# Patient Record
Sex: MALE | Race: BLACK OR AFRICAN AMERICAN | NOT HISPANIC OR LATINO | URBAN - METROPOLITAN AREA
[De-identification: names, ages, dates, MRNs, and addresses within clinical notes are randomized per-mention and may not be internally consistent; named-entity substitution may affect disease eponyms.]

---

## 2017-04-24 ENCOUNTER — HOSPITAL ENCOUNTER (EMERGENCY)
Facility: CLINIC | Age: 37
Discharge: HOME | End: 2017-04-25
Attending: EMERGENCY MEDICINE | Admitting: EMERGENCY MEDICINE
Payer: MEDICAID

## 2017-04-24 DIAGNOSIS — M79.605 PAIN OF LEFT LOWER EXTREMITY: ICD-10-CM

## 2017-04-24 DIAGNOSIS — L29.9 PRURITUS OF SKIN: ICD-10-CM

## 2017-04-24 DIAGNOSIS — L74.519 PRIMARY FOCAL HYPERHIDROSIS: ICD-10-CM

## 2017-04-24 PROCEDURE — 25000132 ZZH RX MED GY IP 250 OP 250 PS 637: Performed by: EMERGENCY MEDICINE

## 2017-04-24 PROCEDURE — 99283 EMERGENCY DEPT VISIT LOW MDM: CPT | Performed by: EMERGENCY MEDICINE

## 2017-04-24 PROCEDURE — 99283 EMERGENCY DEPT VISIT LOW MDM: CPT | Mod: Z6 | Performed by: EMERGENCY MEDICINE

## 2017-04-24 RX ORDER — DIPHENHYDRAMINE HCL 50 MG
50 CAPSULE ORAL ONCE
Status: COMPLETED | OUTPATIENT
Start: 2017-04-24 | End: 2017-04-24

## 2017-04-24 RX ADMIN — DIPHENHYDRAMINE HYDROCHLORIDE 50 MG: 50 CAPSULE ORAL at 23:52

## 2017-04-24 ASSESSMENT — ENCOUNTER SYMPTOMS
CONSTIPATION: 0
COUGH: 0
HEADACHES: 0
VOMITING: 0
NECK PAIN: 0
FEVER: 0
WEAKNESS: 0
FREQUENCY: 0
POLYDIPSIA: 0
COLOR CHANGE: 0
NAUSEA: 0
HEMATURIA: 0
BLOOD IN STOOL: 0
SORE THROAT: 0
ABDOMINAL PAIN: 0
PALPITATIONS: 0
DYSURIA: 0
CHILLS: 0
TROUBLE SWALLOWING: 0
EYE PAIN: 0
SHORTNESS OF BREATH: 0
BACK PAIN: 0
DIARRHEA: 0

## 2017-04-24 NOTE — ED AVS SNAPSHOT
[facility], [place], Emergency Department    [address] [place] [address]    Phone:  [phone]                                       Saba Keith   MRN: [id number]    Department:  [facility], Emergency Department   Date of Visit:  4/24/2017           After Visit Summary Signature Page     I have received my discharge instructions, and my questions have been answered. I have discussed any challenges I see with this plan with the nurse or doctor.    ..........................................................................................................................................  Patient/Patient Representative Signature      ..........................................................................................................................................  Patient Representative Print Name and Relationship to Patient    ..................................................               ................................................  Date                                            Time    ..........................................................................................................................................  Reviewed by Signature/Title    ...................................................              ..............................................  Date                                                            Time

## 2017-04-24 NOTE — ED AVS SNAPSHOT
[facility], Emergency Department    [address] [facility] [place] [address]    Phone:  [phone]                                       Saba Keith   MRN: [id number]    Department:  [facility], Emergency Department   Date of Visit:  4/24/2017           Patient Information     Date Of Birth          1980        Your diagnoses for this visit were:     Pain of left lower extremity        You were seen by Nickie Zarate MD.        Discharge Instructions       TODAY'S VISIT:  You were seen today for left leg itching and abnormal feeling.  - On examination today he do not have clear physical findings for cellulitis/skin infection.  - Your laboratory evaluations including your weight blood cells/infection fighting cells, and inflammatory markers were all in the normal/negative range.    FOLLOW-UP:  Please make an appointment to follow up with:  - Your Primary Care Provider   - If needed, the Neurology Clinic (phone: [phone])     PRESCRIPTIONS / MEDICATIONS:  - You can use the as needed Benadryl/diphenhydramine for itching relief  - You can also try the oral steroids for the next 5 days to see if this helps with some of your symptoms.    OTHER INSTRUCTIONS:  - Do your best to not itch this area.  Try to wear clothing that does not seem to irritate your leg.    RETURN TO THE EMERGENCY DEPARTMENT  Return to the Emergency Department at any time for new/worsening symptoms.       24 Hour Appointment Hotline       To make an appointment at any [facility], call [phone] ([phone]). If you don't have a family doctor or clinic, we will help you find one. [place] clinics are conveniently located to serve the needs of you and your family.             Review of your medicines      START taking        Dose / Directions Last dose taken    diphenhydrAMINE 25 MG tablet   Commonly known as:  BENADRYL   Dose:  25-50 mg   Quantity:  60 tablet        Take 1-2 tablets (25-50 mg) by mouth every 6 hours as  "needed for itching   Refills:  0        predniSONE 20 MG tablet   Commonly known as:  DELTASONE   Quantity:  10 tablet        Take two tablets (= 40mg) each day for 5 (five) days   Refills:  0          Our records show that you are taking the medicines listed below. If these are incorrect, please call your family doctor or clinic.        Dose / Directions Last dose taken    oxyCODONE-acetaminophen 5-325 MG per tablet   Commonly known as:  PERCOCET   Dose:  1 tablet        Take 1 tablet by mouth every 4 hours as needed for moderate to severe pain   Refills:  0                Prescriptions were sent or printed at these locations (2 Prescriptions)                   Other Prescriptions                Printed at Department/Unit printer (2 of 2)         diphenhydrAMINE (BENADRYL) 25 MG tablet               predniSONE (DELTASONE) 20 MG tablet                Procedures and tests performed during your visit     Basic metabolic panel    CBC with platelets differential    CRP inflammation    Erythrocyte sedimentation rate auto      Orders Needing Specimen Collection     None      Pending Results     No orders found for last 3 day(s).            Pending Culture Results     No orders found for last 3 day(s).            Thank you for choosing [facility]       Thank you for choosing [facility] for your care. Our goal is always to provide you with excellent care. Hearing back from our patients is one way we can continue to improve our services. Please take a few minutes to complete the written survey that you may receive in the mail after you visit with us. Thank you!        [employer] Information     [employer] lets you send messages to your doctor, view your test results, renew your prescriptions, schedule appointments and more. To sign up, go to [email or web address] . Click on \"Log in\" on the left side of the screen, which will take you to the Welcome page. Then click on \"Sign up Now\" on the right side of the page.     You will be " asked to enter the access code listed below, as well as some personal information. Please follow the directions to create your username and password.     Your access code is: [email or web address]  Expires: 2017  1:09 AM     Your access code will  in 90 days. If you need help or a new code, please call your [facility] clinic or [phone].        Care EveryWhere ID     This is your Care EveryWhere ID. This could be used by other organizations to access your [facility] medical records  [id number]        After Visit Summary       This is your record. Keep this with you and show to your community pharmacist(s) and doctor(s) at your next visit.

## 2017-04-25 VITALS
DIASTOLIC BLOOD PRESSURE: 76 MMHG | HEIGHT: 66 IN | TEMPERATURE: 98.7 F | WEIGHT: 200 LBS | BODY MASS INDEX: 32.14 KG/M2 | SYSTOLIC BLOOD PRESSURE: 142 MMHG | RESPIRATION RATE: 16 BRPM | HEART RATE: 70 BPM | OXYGEN SATURATION: 99 %

## 2017-04-25 LAB
ANION GAP SERPL CALCULATED.3IONS-SCNC: 6 MMOL/L (ref 3–14)
BASOPHILS # BLD AUTO: 0 10E9/L (ref 0–0.2)
BASOPHILS NFR BLD AUTO: 0.3 %
BUN SERPL-MCNC: 9 MG/DL (ref 7–30)
CALCIUM SERPL-MCNC: 8.2 MG/DL (ref 8.5–10.1)
CHLORIDE SERPL-SCNC: 108 MMOL/L (ref 94–109)
CO2 SERPL-SCNC: 24 MMOL/L (ref 20–32)
CREAT SERPL-MCNC: 0.95 MG/DL (ref 0.66–1.25)
CRP SERPL-MCNC: 3.5 MG/L (ref 0–8)
DIFFERENTIAL METHOD BLD: ABNORMAL
EOSINOPHIL # BLD AUTO: 0.3 10E9/L (ref 0–0.7)
EOSINOPHIL NFR BLD AUTO: 4.4 %
ERYTHROCYTE [DISTWIDTH] IN BLOOD BY AUTOMATED COUNT: 12.7 % (ref 10–15)
ERYTHROCYTE [SEDIMENTATION RATE] IN BLOOD BY WESTERGREN METHOD: 9 MM/H (ref 0–15)
GFR SERPL CREATININE-BSD FRML MDRD: 89 ML/MIN/1.7M2
GLUCOSE SERPL-MCNC: 122 MG/DL (ref 70–99)
HCT VFR BLD AUTO: 39.3 % (ref 40–53)
HGB BLD-MCNC: 13.6 G/DL (ref 13.3–17.7)
IMM GRANULOCYTES # BLD: 0 10E9/L (ref 0–0.4)
IMM GRANULOCYTES NFR BLD: 0.2 %
LYMPHOCYTES # BLD AUTO: 3.5 10E9/L (ref 0.8–5.3)
LYMPHOCYTES NFR BLD AUTO: 58.3 %
MCH RBC QN AUTO: 32.7 PG (ref 26.5–33)
MCHC RBC AUTO-ENTMCNC: 34.6 G/DL (ref 31.5–36.5)
MCV RBC AUTO: 95 FL (ref 78–100)
MONOCYTES # BLD AUTO: 0.5 10E9/L (ref 0–1.3)
MONOCYTES NFR BLD AUTO: 8.1 %
NEUTROPHILS # BLD AUTO: 1.7 10E9/L (ref 1.6–8.3)
NEUTROPHILS NFR BLD AUTO: 28.7 %
NRBC # BLD AUTO: 0 10*3/UL
NRBC BLD AUTO-RTO: 0 /100
PLATELET # BLD AUTO: 191 10E9/L (ref 150–450)
POTASSIUM SERPL-SCNC: 3.8 MMOL/L (ref 3.4–5.3)
RBC # BLD AUTO: 4.16 10E12/L (ref 4.4–5.9)
SODIUM SERPL-SCNC: 138 MMOL/L (ref 133–144)
WBC # BLD AUTO: 5.9 10E9/L (ref 4–11)

## 2017-04-25 PROCEDURE — 85652 RBC SED RATE AUTOMATED: CPT | Performed by: EMERGENCY MEDICINE

## 2017-04-25 PROCEDURE — 86140 C-REACTIVE PROTEIN: CPT | Performed by: EMERGENCY MEDICINE

## 2017-04-25 PROCEDURE — 80048 BASIC METABOLIC PNL TOTAL CA: CPT | Performed by: EMERGENCY MEDICINE

## 2017-04-25 PROCEDURE — 85025 COMPLETE CBC W/AUTO DIFF WBC: CPT | Performed by: EMERGENCY MEDICINE

## 2017-04-25 RX ORDER — PREDNISONE 20 MG/1
TABLET ORAL
Qty: 10 TABLET | Refills: 0 | Status: SHIPPED | OUTPATIENT
Start: 2017-04-25 | End: 2021-03-26

## 2017-04-25 RX ORDER — DIPHENHYDRAMINE HCL 25 MG
25-50 TABLET ORAL EVERY 6 HOURS PRN
Qty: 60 TABLET | Refills: 0 | Status: SHIPPED | OUTPATIENT
Start: 2017-04-25 | End: 2021-03-26

## 2017-04-25 NOTE — ED NOTES
Pt presents to ED with complaints of left leg pain that stemmed from two weeks of constant itching and scratching. Pt states his leg is now painful, red and swollen. Pt has attempted to use cortisone cream for th itching but it has not helped. Pt states his leg is very tender to the touch

## 2017-04-25 NOTE — ED PROVIDER NOTES
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 He suspects this may be due to some recently purchased clothing he wore without washing. (Has since washed the clothing) Per chart review the patient was seen here for similar symptoms 3 years ago and again 6 years ago. In 2014 he was encouraged to try Benadryl OTC at bedtime to assist with itching and then also encouraged to apply Aquaphor lotion to the area for adequate moisturization.    The Patient has been using hydrocortisone cream with no relief. He states symptoms are gradually worsening with increased itching causing it to spread to his right leg as well.He is concerned for infection as his left leg has increased in tenderness and pain. Denies any fatigue, fever, chills, nausea, vomiting, no swelling SOB, cough, diarrhea, chest or abdominal pain. He denies a history of heart disease or shortness of breath.  No other new symptoms or complaints at this time. No other new potential exposures/irritants that he's aware of/no other new clothing, detergent, soap exposure, etc. See ROS for further details.     Reports he's otherwise healthy, no immunosuppresive conditions or medications, no signiicant illnesses or infections.  Works in [employer]      I have reviewed the Medications, Allergies, Past Medical and Surgical History, and Social History in the Epic system.  - PMH: Hx synovial sarcoma, intermittent left leg itching  - PSH: RLE surgery for synovial sarcoma  - SH: Works in [id number]    Review of Systems   Constitutional: Negative for chills and fever.   HENT: Negative for sore throat and trouble swallowing.    Eyes: Negative for pain and visual disturbance.   Respiratory: Negative for  "cough and shortness of breath.    Cardiovascular: Negative for chest pain, palpitations and leg swelling.   Gastrointestinal: Negative for abdominal pain, blood in stool, constipation, diarrhea, nausea and vomiting.   Endocrine: Negative for polydipsia and polyuria.   Genitourinary: Negative for dysuria, frequency and hematuria.   Musculoskeletal: Negative for back pain and neck pain.        Left lower leg itching, sweating   Skin: Negative for color change and rash.   Allergic/Immunologic: Negative for food allergies and immunocompromised state.   Neurological: Negative for weakness and headaches.       Physical Exam     Vitals:    04/24/17 2259   BP: 142/76   Pulse: 73   Resp: 14   Temp: 98.7  F (37.1  C)   TempSrc: Oral   SpO2: 100%   Weight: 90.7 kg (200 lb)   Height: 1.676 m (5' 6\")     Physical Exam   CONSTITUTIONAL: Well-developed and well-nourished. Awake and alert. Non-toxic appearance. No acute distress.   HENT:   - Head: Normocephalic and atraumatic.   - Ears: Hearing and external ear grossly normal.   - Nose: Nose normal. No rhinorrhea. No epistaxis.   - Mouth/Throat: Oropharynx is clear and MMM  EYES: Conjunctivae and lids are normal. No scleral icterus.   NECK: Normal range of motion and phonation normal. Neck supple.  No tracheal deviation, no stridor. No edema or erythema noted.  CARDIOVASCULAR: Normal rate, regular rhythm and no appreciable abnormal heart sounds.  PULMONARY/CHEST: Effort normal. No accessory muscle usage or stridor. No respiratory distress.  No appreciable abnormal breath sounds.  ABDOMEN: Soft, non-distended. No tenderness. No rigidity, rebound or guarding.   MUSCULOSKELETAL: Extremities warm and seemingly well perfused. No edema or calf tenderness. Left lower leg has what looks like fine sweating but not actual weeping, no swelling, no calf tenderness. Skin has no excoriations, no abnormal warmth, no erythema, induration, fullness or fluctuance. Joints normal size w/o effusion. " Strength and sensory intact.   NEUROLOGIC: Awake, alert. Not disoriented. He displays no atrophy and no tremor.  Normal tone. No seizure activity. Coordination normal. GCS 15  SKIN: Skin is warm and dry. No rash noted. No diaphoresis. No pallor.   PSYCHIATRIC: Normal mood and affect. Speech and behavior normal. Thought processes linear. Cognition and memory are normal.       ED Course     ED Course   Comment By Time   WBC, sed rate and inflammatory markers are all normal/negative Nickie Zarate MD 04/25 0123   Patient confirms that he does not have any history of tuberculosis or other infectious or immunologic conditions for which he should not have oral steroids Nickie Zarate MD 04/25 0125     Procedures       11:20 PM  The patient was seen and examined by Dr. Zarate in Room 20.            Labs Ordered and Resulted from Time of ED Arrival Up to the Time of Departure from the ED - No data to display         Assessments & Plan (with Medical Decision Making)   IMPRESSION: 37 yo M, presents with left leg itching and moisture, as described further above in the HPI/ROS. Despite being told on a visit in the past that it was likely infected, that was not the impression had by the provider in 2014, and doesn't look clearly infected at this point. Interestingly, on exam, seems to be having unilateral lower leg sweating. No erythema or warmth, no induration or any other classic findings for cellulitis, and does not appear swollen enough where would think would be weeping from edema. No hx for DVt and does not look swollen. No excoriation or findings for secondary infection. Neurovascularly intact throughout w/o bony tenderness, tenderness or obvious injury. Joints have no effusions or pain with ROM, normal compartments. Could be contact/allergic type reaction. Less likely RSD, or other neuro issue    PLAN: Basic labs, discuss w/ Neuro, symptom management; will likely need to F/U further as outpatient.      RESULTS:  See ED Course section above for particular pertinent findings and comments  - Labs: Labs, including inflammatory markers, WNL    INTERVENTIONS:   PO diphenhydramine    RE-EVALUATION:  Patient continues to look well.     DISCUSSIONS:  - I discussed the care of the patient with   - w/ Neuro: Discussed whether this could be an atypical RSD presentation which may be possible given unilateral extremity involvement, but would expect more pain.   - w/ Patient: I have reviewed the findings, diagnosis, plan and need for follow up with the patient.    DISPOSITION/PLANNING:  - FINAL IMPRESSION: LLE itching, sweating   - DISPOSITION: D/C to home  --- Follow-up: with PCP +/- Neurology  --- Recommendations: Conservative symptom management, strict return instructions  --- Rx:  PRN Benadryl, steroid burst      ______________________________________________________________________________    - I have reviewed the available nursing notes.            New Prescriptions    No medications on file       Final diagnoses:   None     Lynette MINOR , am serving as a trained medical scribe to document services personally performed by Nickie Zarate MD, based on the provider's statements to me.   Nickie MINOR MD, was physically present and have reviewed and verified the accuracy of this note documented by Lynette Hudson.    4/24/2017   [facility], [place], EMERGENCY DEPARTMENT     Nickie Zarate MD  04/27/17 4895

## 2017-04-25 NOTE — DISCHARGE INSTRUCTIONS
TODAY'S VISIT:  You were seen today for left leg itching and abnormal feeling.  - On examination today he do not have clear physical findings for cellulitis/skin infection.  - Your laboratory evaluations including your weight blood cells/infection fighting cells, and inflammatory markers were all in the normal/negative range.    FOLLOW-UP:  Please make an appointment to follow up with:  - Your Primary Care Provider   - If needed, the Neurology Clinic (phone: [phone])     PRESCRIPTIONS / MEDICATIONS:  - You can use the as needed Benadryl/diphenhydramine for itching relief  - You can also try the oral steroids for the next 5 days to see if this helps with some of your symptoms.    OTHER INSTRUCTIONS:  - Do your best to not itch this area.  Try to wear clothing that does not seem to irritate your leg.    RETURN TO THE EMERGENCY DEPARTMENT  Return to the Emergency Department at any time for new/worsening symptoms.

## 2018-06-14 ENCOUNTER — PRE VISIT (OUTPATIENT)
Dept: UROLOGY | Facility: CLINIC | Age: 38
End: 2018-06-14

## 2018-06-14 NOTE — TELEPHONE ENCOUNTER
MEDICAL RECORDS REQUEST   [facility]  Urology Clinic  [address] [place] [address]  PHONE: [phone]  Fax: [phone]        FUTURE VISIT INFORMATION                                                   Saba Keith, : 1980 scheduled for future visit at [facility]    APPOINTMENT INFORMATION:    Date: 2018    Provider:  PIEDAD DALY    Reason for Visit/Diagnosis: ERECTILE DYSFUNCTION    REFERRAL INFORMATION:    Referring provider:  SELF    Specialty: SELF    Referring providers clinic:  SELF    Clinic contact number:  [facility]    RECORDS REQUESTED FOR VISIT                                                     NOTES  STATUS/DETAILS   OFFICE NOTE from referring provider  no   OFFICE NOTE from other specialist  no   DISCHARGE SUMMARY from hospital  no   DISCHARGE REPORT from the ER  no   OPERATIVE REPORT  no   MEDICATION LIST  yes       PRE-VISIT CHECKLIST      Record collection complete Yes  NO OUTSIDE RECORDS ..CDK   Appointment appropriately scheduled           (right time/right provider) Yes   Joint diagnostic appointment coordinated correctly          (ensure right order & amount of time) Yes   MyChart activation Yes   Questionnaire complete If no, please explain IN PROCESS     Completed by: Chelle Monge

## 2018-07-09 ENCOUNTER — PRE VISIT (OUTPATIENT)
Dept: UROLOGY | Facility: CLINIC | Age: 38
End: 2018-07-09

## 2018-07-09 NOTE — TELEPHONE ENCOUNTER
Patient with history of ED coming in for consult with Dr. Hunt. Patient chart reviewed, no need for call, all records available and ready for appointment.

## 2018-09-06 ENCOUNTER — HOSPITAL ENCOUNTER (EMERGENCY)
Facility: CLINIC | Age: 38
Discharge: HOME | End: 2018-09-07
Attending: EMERGENCY MEDICINE | Admitting: EMERGENCY MEDICINE
Payer: MEDICAID

## 2018-09-06 DIAGNOSIS — H60.393 INFECTIVE OTITIS EXTERNA, BILATERAL: ICD-10-CM

## 2018-09-06 PROCEDURE — 99283 EMERGENCY DEPT VISIT LOW MDM: CPT | Mod: Z6 | Performed by: EMERGENCY MEDICINE

## 2018-09-06 PROCEDURE — 99283 EMERGENCY DEPT VISIT LOW MDM: CPT | Performed by: EMERGENCY MEDICINE

## 2018-09-06 RX ORDER — CIPROFLOXACIN AND DEXAMETHASONE 3; 1 MG/ML; MG/ML
4 SUSPENSION/ DROPS AURICULAR (OTIC) 2 TIMES DAILY
Status: DISCONTINUED | OUTPATIENT
Start: 2018-09-07 | End: 2018-09-07 | Stop reason: HOSPADM

## 2018-09-06 RX ORDER — CIPROFLOXACIN AND DEXAMETHASONE 3; 1 MG/ML; MG/ML
4 SUSPENSION/ DROPS AURICULAR (OTIC) 2 TIMES DAILY
Qty: 7.5 ML | Refills: 0 | Status: SHIPPED | OUTPATIENT
Start: 2018-09-06 | End: 2018-09-13

## 2018-09-06 NOTE — ED AVS SNAPSHOT
[facility], [place], Emergency Department    [address] [place] [address]    Phone:  [phone]                                       Saba Keith   MRN: [id number]    Department:  [facility], Emergency Department   Date of Visit:  9/6/2018           After Visit Summary Signature Page     I have received my discharge instructions, and my questions have been answered. I have discussed any challenges I see with this plan with the nurse or doctor.    ..........................................................................................................................................  Patient/Patient Representative Signature      ..........................................................................................................................................  Patient Representative Print Name and Relationship to Patient    ..................................................               ................................................  Date                                            Time    ..........................................................................................................................................  Reviewed by Signature/Title    ...................................................              ..............................................  Date                                                            Time          22EPIC Rev 08/18

## 2018-09-06 NOTE — ED AVS SNAPSHOT
[facility], Emergency Department    [address] [address]    Phone:  [phone]                                       Saba Keith   MRN: [id number]    Department:  [facility], Emergency Department   Date of Visit:  9/6/2018           Patient Information     Date Of Birth          1980        Your diagnoses for this visit were:     Infective otitis externa, bilateral        You were seen by Sam Dick DO.        Discharge Instructions         Return to the emergency department if symptoms continue, get worse, there are any new symptoms or any cause for concern.  External Ear Infection (Adult)    External otitis (also called  swimmer s ear ) is an infection in the ear canal. It is often caused by bacteria or fungus. It can occur a few days after water gets trapped in the ear canal (from swimming or bathing). It can also occur after cleaning too deeply in the ear canal with a cotton swab or other object. Sometimes, hair care products get into the ear canal and cause this problem.  Symptoms can include pain, fever, itching, redness, drainage, or swelling of the ear canal. Temporary hearing loss may also occur.  Home care    Do not try to clean the ear canal. This can push pus and bacteria deeper into the canal.    Use prescribed ear drops as directed. These help reduce swelling and fight the infection. If an ear wick was placed in the ear canal, apply drops right onto the end of the wick. The wick will draw the medicine into the ear canal even if it is swollen closed.    A cotton ball may be loosely placed in the outer ear to absorb any drainage.    You may use acetaminophen or ibuprofen to control pain, unless another medicine was prescribed. Note: If you have chronic liver or kidney disease or ever had a stomach ulcer or GI bleeding, talk to your healthcare provider before taking any of these medicines.    Do not allow water to get into your ear when bathing. Also, don't swim  until the infection has cleared.  Prevention    Keep your ears dry. This helps lower the risk of infection. Dry your ears with a towel or hair dryer after getting wet. Also, use ear plugs when swimming.    Do not stick any objects in the ear to remove wax.    If you feel water trapped in your ear, use ear drops right away. You can get these drops over the counter at most drugstores. They work by removing water from the ear canal.  Follow-up care  Follow up with your healthcare provider in 1 week, or as advised.  When to seek medical advice  Call your healthcare provider right away if any of these occur:    Ear pain becomes worse or doesn t improve after 3 days of treatment    Redness or swelling of the outer ear occurs or gets worse    Headache    Painful or stiff neck    Drowsiness or confusion    Fever of 100.4 F (38 C) or higher, or as directed by your healthcare provider    Seizure  Date Last Reviewed: 10/1/2017    [id number] The [employer]. [address] [place] [address]. All rights reserved. This information is not intended as a substitute for professional medical care. Always follow your healthcare professional's instructions.          24 Hour Appointment Hotline       To make an appointment at any [facility] clinic, call [phone] ([phone]). If you don't have a family doctor or clinic, we will help you find one. [facility] clinics are conveniently located to serve the needs of you and your family.             Review of your medicines      START taking        Dose / Directions Last dose taken    amoxicillin-clavulanate 875-125 MG per tablet   Commonly known as:  AUGMENTIN   Dose:  1 tablet   Quantity:  14 tablet        Take 1 tablet by mouth 2 times daily for 7 days   Refills:  0        ciprofloxacin-dexamethasone otic suspension   Commonly known as:  CIPRODEX   Dose:  4 drop   Quantity:  7.5 mL        Place 4 drops into both ears 2 times daily for 7 days   Refills:  0           Our records show that you are taking the medicines listed below. If these are incorrect, please call your family doctor or clinic.        Dose / Directions Last dose taken    diphenhydrAMINE 25 MG tablet   Commonly known as:  BENADRYL   Dose:  25-50 mg   Quantity:  60 tablet        Take 1-2 tablets (25-50 mg) by mouth every 6 hours as needed for itching   Refills:  0        oxyCODONE-acetaminophen 5-325 MG per tablet   Commonly known as:  PERCOCET   Dose:  1 tablet        Take 1 tablet by mouth every 4 hours as needed for moderate to severe pain   Refills:  0        predniSONE 20 MG tablet   Commonly known as:  DELTASONE   Quantity:  10 tablet        Take two tablets (= 40mg) each day for 5 (five) days   Refills:  0                Prescriptions were sent or printed at these locations (2 Prescriptions)                   Other Prescriptions                Printed at Department/Unit printer (2 of 2)         amoxicillin-clavulanate (AUGMENTIN) 875-125 MG per tablet               ciprofloxacin-dexamethasone (CIPRODEX) otic suspension                Orders Needing Specimen Collection     None      Pending Results     No orders found for last 3 day(s).            Pending Culture Results     No orders found for last 3 day(s).            Pending Results Instructions     If you had any lab results that were not finalized at the time of your Discharge, you can call the ED Lab Result RN at [phone]. You will be contacted by this team for any positive Lab results or changes in treatment. The nurses are available 7 days a week from 10A to 6:30P.  You can leave a message 24 hours per day and they will return your call.        Thank you for choosing [facility]       Thank you for choosing [facility] for your care. Our goal is always to provide you with excellent care. Hearing back from our patients is one way we can continue to improve our services. Please take a few minutes to complete the written survey that you may receive in  "the mail after you visit with us. Thank you!        [employer] Information     [email or web address] lets you send messages to your doctor, view your test results, renew your prescriptions, schedule appointments and more. To sign up, go to [email or web address] . Click on \"Log in\" on the left side of the screen, which will take you to the Welcome page. Then click on \"Sign up Now\" on the right side of the page.     You will be asked to enter the access code listed below, as well as some personal information. Please follow the directions to create your username and password.     Your access code is: [email or web address]  Expires: 2018  6:30 AM     Your access code will  in 90 days. If you need help or a new code, please call your [facility] clinic or [phone].        Care EveryWhere ID     This is your Care EveryWhere ID. This could be used by other organizations to access your [facility] medical records  [id number]        Equal Access to Services     [facility] Center[facility] : Hadii sy blackmano Soradha, waaxda luqadaha, qaybta kaalmada adebety, ricci bhagat . So [facility] [phone].    ATENCIÓN: Si habla español, tiene a fowler disposición servicios gratuitos de asistencia lingüística. Llame al [phone].    We comply with applicable federal civil rights laws and [place] laws. We do not discriminate on the basis of race, color, national origin, age, disability, sex, sexual orientation, or gender identity.            After Visit Summary       This is your record. Keep this with you and show to your community pharmacist(s) and doctor(s) at your next visit.                  "

## 2018-09-07 VITALS
OXYGEN SATURATION: 95 % | DIASTOLIC BLOOD PRESSURE: 70 MMHG | BODY MASS INDEX: 33.22 KG/M2 | SYSTOLIC BLOOD PRESSURE: 130 MMHG | HEART RATE: 103 BPM | RESPIRATION RATE: 16 BRPM | TEMPERATURE: 98.9 F | WEIGHT: 205.8 LBS

## 2018-09-07 PROCEDURE — 25000132 ZZH RX MED GY IP 250 OP 250 PS 637: Performed by: EMERGENCY MEDICINE

## 2018-09-07 RX ADMIN — AMOXICILLIN AND CLAVULANATE POTASSIUM 1 TABLET: 875; 125 TABLET, FILM COATED ORAL at 00:13

## 2018-09-07 RX ADMIN — CIPROFLOXACIN AND DEXAMETHASONE 4 DROP: 3; 1 SUSPENSION/ DROPS AURICULAR (OTIC) at 00:17

## 2018-09-07 ASSESSMENT — ENCOUNTER SYMPTOMS
FACIAL SWELLING: 0
ABDOMINAL PAIN: 0
SINUS PAIN: 0
FEVER: 0
SHORTNESS OF BREATH: 0

## 2018-09-07 NOTE — DISCHARGE INSTRUCTIONS
Return to the emergency department if symptoms continue, get worse, there are any new symptoms or any cause for concern.  External Ear Infection (Adult)    External otitis (also called  swimmer s ear ) is an infection in the ear canal. It is often caused by bacteria or fungus. It can occur a few days after water gets trapped in the ear canal (from swimming or bathing). It can also occur after cleaning too deeply in the ear canal with a cotton swab or other object. Sometimes, hair care products get into the ear canal and cause this problem.  Symptoms can include pain, fever, itching, redness, drainage, or swelling of the ear canal. Temporary hearing loss may also occur.  Home care    Do not try to clean the ear canal. This can push pus and bacteria deeper into the canal.    Use prescribed ear drops as directed. These help reduce swelling and fight the infection. If an ear wick was placed in the ear canal, apply drops right onto the end of the wick. The wick will draw the medicine into the ear canal even if it is swollen closed.    A cotton ball may be loosely placed in the outer ear to absorb any drainage.    You may use acetaminophen or ibuprofen to control pain, unless another medicine was prescribed. Note: If you have chronic liver or kidney disease or ever had a stomach ulcer or GI bleeding, talk to your healthcare provider before taking any of these medicines.    Do not allow water to get into your ear when bathing. Also, don't swim until the infection has cleared.  Prevention    Keep your ears dry. This helps lower the risk of infection. Dry your ears with a towel or hair dryer after getting wet. Also, use ear plugs when swimming.    Do not stick any objects in the ear to remove wax.    If you feel water trapped in your ear, use ear drops right away. You can get these drops over the counter at most drugstores. They work by removing water from the ear canal.  Follow-up care  Follow up with your healthcare  provider in 1 week, or as advised.  When to seek medical advice  Call your healthcare provider right away if any of these occur:    Ear pain becomes worse or doesn t improve after 3 days of treatment    Redness or swelling of the outer ear occurs or gets worse    Headache    Painful or stiff neck    Drowsiness or confusion    Fever of 100.4 F (38 C) or higher, or as directed by your healthcare provider    Seizure  Date Last Reviewed: 10/1/2017    [id number] [employer]. [address] [place], [place] [id number]. All rights reserved. This information is not intended as a substitute for professional medical care. Always follow your healthcare professional's instructions.

## 2018-09-07 NOTE — ED PROVIDER NOTES
"  History     Chief Complaint   Patient presents with     Ear Fullness     HPI  Saba Keith is a 38 year old male who presents to the ED with complaints of bilateral \"thumping\" ear pain. He also reports clogged hearing, and swelling. Denies ear drainage. States he was at work when he \"started with a tooth pic\" and then realized what he was doing and then picked up a q-tip that had \"dust and stuff\" on it and used it to clean his ears. He also put antiinflammatory powder \"dienteatious earth\" in his ears and it gave some relief but the pain has since progressed. He reports having a similar ear infection in the past. His ear is sensitive. No systemic symptoms. No other symptoms noted.     I have reviewed the Medications, Allergies, Past Medical and Surgical History, and Social History in the [employer] system.  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     ORTHOPEDIC SURGERY         Family History   Problem Relation Age of Onset     Family History Negative No family hx of        Social History   Substance Use Topics     Smoking status: Never Smoker     Smokeless tobacco: Never Used     Alcohol use No       No current facility-administered medications for this encounter.      Current Outpatient Prescriptions   Medication     amoxicillin-clavulanate (AUGMENTIN) 875-125 MG per tablet     ciprofloxacin-dexamethasone (CIPRODEX) otic suspension     diphenhydrAMINE (BENADRYL) 25 MG tablet     oxyCODONE-acetaminophen (PERCOCET) 5-325 MG per tablet     predniSONE (DELTASONE) 20 MG tablet      No Known Allergies    Review of Systems   Constitutional: Negative for fever.   HENT: Positive for ear pain. Negative for congestion, ear discharge, facial swelling, hearing loss, sinus pain and tinnitus.         Clogged hearing    Respiratory: Negative for shortness " "of breath.    Cardiovascular: Negative for chest pain.   Gastrointestinal: Negative for abdominal pain.   All other systems reviewed and are negative.      Physical Exam   BP: 133/76  Pulse: 103  Temp: 98.5  F (36.9  C)  Resp: 16  Weight: 93.4 kg (205 lb 12.8 oz)  SpO2: 96 %      Physical Exam   Constitutional: He is oriented to person, place, and time. He appears well-developed and well-nourished.   HENT:   Right Ear: There is swelling and tenderness.   Left Ear: There is swelling and tenderness.   Bilateral swelling, irritation and redness in both ear canals.  Both TMs visualized, appear to be clear.   Cardiovascular: Normal rate, regular rhythm and normal heart sounds.  Exam reveals no gallop and no friction rub.    No murmur heard.  Pulmonary/Chest: Effort normal and breath sounds normal. No respiratory distress. He has no wheezes. He has no rales.   Abdominal: Soft. Bowel sounds are normal. He exhibits no distension. There is no tenderness.   Neurological: He is alert and oriented to person, place, and time. No cranial nerve deficit. Coordination normal.   Skin: Skin is warm. No rash noted. He is not diaphoretic. No erythema.   Psychiatric: He has a normal mood and affect. His behavior is normal.       ED Course     ED Course     Procedures             Critical Care time:  none             Labs Ordered and Resulted from Time of ED Arrival Up to the Time of Departure from the ED - No data to display         Assessments & Plan (with Medical Decision Making)   This is a 38-year-old male with bilateral ear pain after cleaning both ears with what he describes as a dirty Q-tip.  He also put an \"antiinflammatory powder\" in both ears.  He has a history of ear infection in the past.  No history of diabetes.  He does not appear to have redness and irritation in both external canals.  There is swelling in both canals but TMs can be visualized.  Will give patient both oral and topical antibiotics for this.  Stressed " reasons to return to the emergency department.  Patient understands and agrees with the plan.    I have reviewed the nursing notes.    I have reviewed the findings, diagnosis, plan and need for follow up with the patient.    New Prescriptions    No medications on file       Final diagnoses:   None     ILorena, am serving as a trained medical scribe to document services personally performed by Sam Dick DO, based on the provider's statements to me.   Sam MINOR DO, was physically present and have reviewed and verified the accuracy of this note documented by Lorena Ozuna.    9/6/2018   [facility], [place], EMERGENCY DEPARTMENT     Sam Dick DO  09/07/18 0008

## 2018-09-07 NOTE — ED TRIAGE NOTES
"itched both inner ears with don que tip yesterday,   beleives there is now an infection, feels like ears have water in them and getting tight, painfull     also put \"diatenatious earth\" in ears yesterday   "

## 2019-04-08 ENCOUNTER — APPOINTMENT (OUTPATIENT)
Dept: GENERAL RADIOLOGY | Facility: CLINIC | Age: 39
End: 2019-04-08
Attending: INTERNAL MEDICINE
Payer: MEDICAID

## 2019-04-08 ENCOUNTER — HOSPITAL ENCOUNTER (EMERGENCY)
Facility: CLINIC | Age: 39
Discharge: HOME | End: 2019-04-08
Attending: INTERNAL MEDICINE | Admitting: INTERNAL MEDICINE
Payer: MEDICAID

## 2019-04-08 VITALS
DIASTOLIC BLOOD PRESSURE: 84 MMHG | SYSTOLIC BLOOD PRESSURE: 139 MMHG | BODY MASS INDEX: 32.14 KG/M2 | OXYGEN SATURATION: 99 % | WEIGHT: 200 LBS | TEMPERATURE: 98.8 F | HEART RATE: 60 BPM | RESPIRATION RATE: 16 BRPM | HEIGHT: 66 IN

## 2019-04-08 DIAGNOSIS — S82.892A ANKLE FRACTURE, LEFT, CLOSED, INITIAL ENCOUNTER: ICD-10-CM

## 2019-04-08 DIAGNOSIS — S82.832A CLOSED FRACTURE OF LEFT DISTAL FIBULA: ICD-10-CM

## 2019-04-08 LAB — RADIOLOGIST FLAGS: ABNORMAL

## 2019-04-08 PROCEDURE — 27786 TREATMENT OF ANKLE FRACTURE: CPT | Mod: 54 | Performed by: INTERNAL MEDICINE

## 2019-04-08 PROCEDURE — 73610 X-RAY EXAM OF ANKLE: CPT | Mod: LT

## 2019-04-08 PROCEDURE — 99284 EMERGENCY DEPT VISIT MOD MDM: CPT | Mod: 25

## 2019-04-08 PROCEDURE — 99283 EMERGENCY DEPT VISIT LOW MDM: CPT | Mod: 25 | Performed by: INTERNAL MEDICINE

## 2019-04-08 PROCEDURE — 73562 X-RAY EXAM OF KNEE 3: CPT | Mod: RT

## 2019-04-08 PROCEDURE — 27786 TREATMENT OF ANKLE FRACTURE: CPT | Mod: LT

## 2019-04-08 RX ORDER — HYDROCODONE BITARTRATE AND ACETAMINOPHEN 5; 325 MG/1; MG/1
1 TABLET ORAL
Qty: 18 TABLET | Refills: 0 | Status: SHIPPED | OUTPATIENT
Start: 2019-04-08 | End: 2019-04-11

## 2019-04-08 ASSESSMENT — MIFFLIN-ST. JEOR: SCORE: 1764.94

## 2019-04-08 NOTE — ED PROVIDER NOTES
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  He was apparently doing yard work one in the morning and rolled his left ankle.  He initially had a right knee pain as well but that seems to be subsided.  He denies any other injury.      This part of the medical record was transcribed by Cherie Mera Medical Scribe, from a dictation done by MD Farrah.     I have reviewed the Medications, Allergies, Past Medical and Surgical History, and Social History in the [employer] system.    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     ORTHOPEDIC SURGERY         Family History   Problem Relation Age of Onset     Family History Negative No family hx of        Social History     Tobacco Use     Smoking status: Never Smoker     Smokeless tobacco: Never Used   Substance Use Topics     Alcohol use: No     No current facility-administered medications for this encounter.      Current Outpatient Medications   Medication     HYDROcodone-acetaminophen (NORCO) 5-325 MG tablet     diphenhydrAMINE (BENADRYL) 25 MG tablet     oxyCODONE-acetaminophen (PERCOCET) 5-325 MG per tablet     predniSONE (DELTASONE) 20 MG tablet      No Known Allergies    Review of Systems   Musculoskeletal:        Positive for left ankle pain.   All other systems reviewed and are negative.      Physical Exam   BP: 124/77  Pulse: 60  Heart Rate: 60  Temp: 98.2  F (36.8  C)  Resp: 16  Height: 167.6 cm (5' 6\")  Weight: 90.7 kg (200 lb)  SpO2: 96 %      Physical Exam   Constitutional: No distress.   HENT:   Head: Atraumatic.   Mouth/Throat: Oropharynx is clear and moist.   Eyes: Pupils are equal, round, and reactive to light. No scleral icterus. "   Cardiovascular: Normal heart sounds and intact distal pulses.   Pulmonary/Chest: Breath sounds normal. No respiratory distress.   Abdominal: Soft. Bowel sounds are normal. There is no tenderness.   Musculoskeletal: He exhibits no edema.        Right knee: Normal.        Left ankle: He exhibits decreased range of motion and swelling. He exhibits no ecchymosis, no deformity, no laceration and normal pulse. Tenderness. Lateral malleolus and AITFL tenderness found. No medial malleolus, no CF ligament, no posterior TFL, no head of 5th metatarsal and no proximal fibula tenderness found. Achilles tendon normal.        Feet:    Skin: Skin is warm. No rash noted. He is not diaphoretic.       ED Course        Procedures        Results for orders placed or performed during the hospital encounter of 04/08/19 (from the past 24 hour(s))   XR Ankle Left G/E 3 Views     Status: Abnormal    Collection Time: 04/08/19  7:51 AM   Result Value Ref Range    Radiologist flags Fibular fracture (Urgent)     Narrative    3 views left ankle radiographs 4/8/2019 8:22 AM    History: Eversion of the left ankle     Comparison: None    Findings:    Standing AP, oblique, and lateral  views of the left ankle were  obtained.     Mildly dorsal and lateral displaced fracture of the distal fibula at  the level of syndesmosis. Ankle mortise and syndesmosis are not  assessed in this nonweight bearing study.     Mild soft tissue swelling at the ankle. Os trigonum. Mild osseous  spurring in the anterior distal tibia.      Impression    Impression:  Mildly dorsally and laterally displaced fracture of the distal fibula  at the level of syndesmosis. Ankle mortise and syndesmosis are not  assessed in this nonweight bearing exam.    [Urgent Result: Fibular fracture]    Finding was identified on 4/8/2019 8:20 AM.     Dr. Yan was contacted by Dr. Nichols at 4/8/2019 8:23 AM and  verbalized understanding of the urgent finding.     I have personally reviewed the  examination and initial interpretation  and I agree with the findings.    JUTTA ELLERMANN, MD   XR Knee Right 3 Views     Status: None (Preliminary result)    Collection Time: 04/08/19  7:55 AM    Narrative    3 views left knee radiographs 4/8/2019 8:14 AM    History: pain after fall     Additional History from EMR: History of sarcoma of the right knee    Comparison: Right knee 8/5/2016 plain film, 6/10/2015    Findings:    AP, lateral and patellofemoral views of the left knee were obtained.     No acute osseous abnormality. Trace joint effusion. Bony ossification  superior to the patella at the tendon insertion site.     Surgical clips in the posterior compartment of distal thigh and  proximal leg.      Impression    Impression:  No acute osseous abnormality. Trace significant joint effusion.             Labs Ordered and Resulted from Time of ED Arrival Up to the Time of Departure from the ED - No data to display         Assessments & Plan (with Medical Decision Making)  Left distal fibular fx with mechanical fall, cam boot and crutches for no weight bearing, vicodin prn for pain, follow up with Ortho in one week.       I have reviewed the nursing notes.    I have reviewed the findings, diagnosis, plan and need for follow up with the patient.       Medication List      Started    HYDROcodone-acetaminophen 5-325 MG tablet  Commonly known as:  NORCO  1 tablet, Oral, AT BEDTIME PRN            Final diagnoses:   Ankle fracture, left, closed, initial encounter       4/8/2019   [facility], [place], EMERGENCY DEPARTMENT     Micheline Yan MD  04/08/19 1500

## 2019-04-08 NOTE — ED TRIAGE NOTES
Pt came through triage with crutches d/t ankle injury where he everted his left ankle (heard a clicking noise) while doing yard work and slipping on mud at 0100 today. Pt states his right knee has increased acute pain on top of chronic pain (Hx: sarcoma of the R. Knee) in that joint after the fall. Pt had numbness/tingling in the ankle initially after the event which has resolved. Pt. elevated/iced ankle with no relief but says Advil has helped. DP/PT pulses palpable and capillary refill/senstation intact.

## 2019-04-08 NOTE — DISCHARGE INSTRUCTIONS
Please make an appointment to follow up with Orthopedics (phone: [phone]) in 5-10 days even if entirely better.

## 2019-04-08 NOTE — ED AVS SNAPSHOT
[facility], [place], Emergency Department  [address] [place] [address]  Phone:  [phone]                                    Saba Keith   MRN: [id number]    Department:  [facility], Emergency Department   Date of Visit:  4/8/2019           After Visit Summary Signature Page    I have received my discharge instructions, and my questions have been answered. I have discussed any challenges I see with this plan with the nurse or doctor.    ..........................................................................................................................................  Patient/Patient Representative Signature      ..........................................................................................................................................  Patient Representative Print Name and Relationship to Patient    ..................................................               ................................................  Date                                   Time    ..........................................................................................................................................  Reviewed by Signature/Title    ...................................................              ..............................................  Date                                               Time          22EPIC Rev 08/18

## 2021-03-11 NOTE — TELEPHONE ENCOUNTER
FUTURE VISIT INFORMATION      FUTURE VISIT INFORMATION:    Date: 3.26.21    Time: 10:45    Location: [facility]  REFERRAL INFORMATION:    Referring provider:  na    Referring providers clinic:  na    Reason for visit/diagnosis  Scar consult, per pts wife    RECORDS REQUESTED FROM:       Clinic name Comments Records Status Imaging Status    No previous related records

## 2021-03-26 ENCOUNTER — PRE VISIT (OUTPATIENT)
Dept: DERMATOLOGY | Facility: CLINIC | Age: 41
End: 2021-03-26

## 2021-03-26 ENCOUNTER — OFFICE VISIT (OUTPATIENT)
Dept: DERMATOLOGY | Facility: CLINIC | Age: 41
End: 2021-03-26
Payer: COMMERCIAL

## 2021-03-26 DIAGNOSIS — L81.9 HYPERPIGMENTATION: Primary | ICD-10-CM

## 2021-03-26 PROCEDURE — 99203 OFFICE O/P NEW LOW 30 MIN: CPT | Performed by: DERMATOLOGY

## 2021-03-26 RX ORDER — HYDROQUINONE 40 MG/G
CREAM TOPICAL
Qty: 28.35 G | Refills: 3 | Status: SHIPPED | OUTPATIENT
Start: 2021-03-26

## 2021-03-26 ASSESSMENT — PAIN SCALES - GENERAL: PAINLEVEL: NO PAIN (0)

## 2021-03-26 NOTE — LETTER
3/26/2021       RE: Saba Keith  [address]  [facility] [address]     Dear Colleague,    Thank you for referring your patient, Saba Keith, to the [facility] DERMATOLOGY CLINIC [place] at [facility]. Please see a copy of my visit note below.    [facility] Dermatology Note  Encounter Date: Mar 26, 2021  Office Visit     Dermatology Problem List:  1. Hyperpigmentation 2/2 lemon on skin of right lower eyelid  - Hydroquinone 4% cream BID x3 months on, then one month off, then repeat as needed  - Diligent sunscreen    ____________________________________________    Assessment & Plan:     # Hyperpigmentation: This is either phytophotodermatitis or chemical burn from the lemon exposure on the skin. Of note, patient has old scar from childhood in the R medial canthus that does not bother him. Rather, it is the hyperpigmentation he is concerned about. Discussed that pigmentation does tend to fade over time, but it is a very slow process. To speed up process, recommended dilgenec with photoprotection (daily sunscreen) and hydroquinone 4% cream BID for 3 months on, then one month off, then cycle again as needed. We discussed paradoxical darkening of the skin can occur with continuous use of hydroquinone. We discussed that hydroquinone can be irritating to the skin, which can cause skin darkening too, so to take breaks from using if skin becomes irritated and only to use a very thin layer of the hydroquinone with each application. We discussed lack of insurance coverage of hydroquinone and how to get cheapest price through [employer]. Coupon was printed for patient today. Would classify as chronic problem (present 2 years), and stable.     Procedures Performed:   None    Follow-up: PRN    Staff:     Esperanza Morgan MD    Department of Dermatology  [facility]:  "Phone: [phone], Fax:[phone]  [facility]: Phone: [phone], Fax: [phone]    ____________________________________________    CC: Derm Problem (concerned about scar on face from a \"lemon that scarred face\" after he put it on a \"bump\" in the area)    HPI:  Mr. Saba Keith is a(n) 40 year old male who presents today as a new patient for scar on the face.    Saba notes that he had a bump on the right side of the nose. A family member told him applying lemon could help. After applying it on the skin, he fell asleep with it on. He woke up with what seemed like a burn to him. It was quite tender. Since then (about 2 years ago), it has been discolored. This bugs him. Of note, he has a linear scar on the right side of his nose that is from childhood. This does not bother him at all.    Patient is otherwise feeling well, without additional skin concerns.     Labs Reviewed:  N/A    Physical Exam:  Vitals: There were no vitals taken for this visit.  SKIN: Focused exam of the face.  - Linear appearing hyperpigmentation from the right medial canthus to the right lower eyelid.  - No other lesions of concern on areas examined.     Medications:  Current Outpatient Medications   Medication     diphenhydrAMINE (BENADRYL) 25 MG tablet     oxyCODONE-acetaminophen (PERCOCET) 5-325 MG per tablet     predniSONE (DELTASONE) 20 MG tablet     No current facility-administered medications for this visit.       Past Medical History:   Patient Active Problem List   Diagnosis     Sarcoma (H)     Medication refill-Do not remove     Pain medication agreement signed-Dr. Mak 01/08/2015     Past Medical History:   Diagnosis Date     Cancer (H)     sarcoma 2006     NO ACTIVE PROBLEMS        CC Referred Self, MD  No address on file on close of this encounter.      "

## 2021-03-26 NOTE — NURSING NOTE
"Chief Complaint   Patient presents with     Derm Problem     concerned about scar on face from a \"lemon that scarred face\" after he put it on a \"bump\" in the area     Dina Hardy, EMT    "

## 2021-05-27 ENCOUNTER — OFFICE VISIT (OUTPATIENT)
Dept: INTERNAL MEDICINE | Facility: CLINIC | Age: 41
End: 2021-05-27
Payer: COMMERCIAL

## 2021-05-27 VITALS
SYSTOLIC BLOOD PRESSURE: 139 MMHG | WEIGHT: 206 LBS | DIASTOLIC BLOOD PRESSURE: 82 MMHG | BODY MASS INDEX: 33.25 KG/M2 | HEART RATE: 75 BPM | OXYGEN SATURATION: 99 %

## 2021-05-27 DIAGNOSIS — N52.1 ERECTILE DYSFUNCTION DUE TO DISEASES CLASSIFIED ELSEWHERE: ICD-10-CM

## 2021-05-27 DIAGNOSIS — Z00.00 PREVENTATIVE HEALTH CARE: ICD-10-CM

## 2021-05-27 DIAGNOSIS — Z00.00 PREVENTATIVE HEALTH CARE: Primary | ICD-10-CM

## 2021-05-27 LAB
ALBUMIN SERPL-MCNC: 3.8 G/DL (ref 3.4–5)
ALP SERPL-CCNC: 96 U/L (ref 40–150)
ALT SERPL W P-5'-P-CCNC: 31 U/L (ref 0–70)
ANION GAP SERPL CALCULATED.3IONS-SCNC: 4 MMOL/L (ref 3–14)
AST SERPL W P-5'-P-CCNC: 23 U/L (ref 0–45)
BASOPHILS # BLD AUTO: 0 10E9/L (ref 0–0.2)
BASOPHILS NFR BLD AUTO: 0.8 %
BILIRUB SERPL-MCNC: 0.6 MG/DL (ref 0.2–1.3)
BUN SERPL-MCNC: 8 MG/DL (ref 7–30)
CALCIUM SERPL-MCNC: 8.8 MG/DL (ref 8.5–10.1)
CHLORIDE SERPL-SCNC: 108 MMOL/L (ref 94–109)
CHOLEST SERPL-MCNC: 159 MG/DL
CO2 SERPL-SCNC: 28 MMOL/L (ref 20–32)
CREAT SERPL-MCNC: 1.04 MG/DL (ref 0.66–1.25)
DIFFERENTIAL METHOD BLD: ABNORMAL
EOSINOPHIL # BLD AUTO: 0.2 10E9/L (ref 0–0.7)
EOSINOPHIL NFR BLD AUTO: 3.6 %
ERYTHROCYTE [DISTWIDTH] IN BLOOD BY AUTOMATED COUNT: 12.6 % (ref 10–15)
GFR SERPL CREATININE-BSD FRML MDRD: 88 ML/MIN/{1.73_M2}
GLUCOSE SERPL-MCNC: 90 MG/DL (ref 70–99)
HCT VFR BLD AUTO: 41.9 % (ref 40–53)
HDLC SERPL-MCNC: 44 MG/DL
HGB BLD-MCNC: 14.7 G/DL (ref 13.3–17.7)
IMM GRANULOCYTES # BLD: 0 10E9/L (ref 0–0.4)
IMM GRANULOCYTES NFR BLD: 0.2 %
LDLC SERPL CALC-MCNC: 105 MG/DL
LYMPHOCYTES # BLD AUTO: 1.6 10E9/L (ref 0.8–5.3)
LYMPHOCYTES NFR BLD AUTO: 32.8 %
MCH RBC QN AUTO: 33.1 PG (ref 26.5–33)
MCHC RBC AUTO-ENTMCNC: 35.1 G/DL (ref 31.5–36.5)
MCV RBC AUTO: 94 FL (ref 78–100)
MONOCYTES # BLD AUTO: 0.7 10E9/L (ref 0–1.3)
MONOCYTES NFR BLD AUTO: 13.8 %
NEUTROPHILS # BLD AUTO: 2.4 10E9/L (ref 1.6–8.3)
NEUTROPHILS NFR BLD AUTO: 48.8 %
NONHDLC SERPL-MCNC: 115 MG/DL
NRBC # BLD AUTO: 0 10*3/UL
NRBC BLD AUTO-RTO: 0 /100
PLATELET # BLD AUTO: 236 10E9/L (ref 150–450)
POTASSIUM SERPL-SCNC: 4 MMOL/L (ref 3.4–5.3)
PROT SERPL-MCNC: 7.7 G/DL (ref 6.8–8.8)
PSA SERPL-ACNC: 1.4 UG/L (ref 0–4)
RBC # BLD AUTO: 4.44 10E12/L (ref 4.4–5.9)
SODIUM SERPL-SCNC: 140 MMOL/L (ref 133–144)
TRIGL SERPL-MCNC: 54 MG/DL
WBC # BLD AUTO: 5 10E9/L (ref 4–11)

## 2021-05-27 PROCEDURE — 80053 COMPREHEN METABOLIC PANEL: CPT | Performed by: PATHOLOGY

## 2021-05-27 PROCEDURE — 84403 ASSAY OF TOTAL TESTOSTERONE: CPT | Mod: 90 | Performed by: PATHOLOGY

## 2021-05-27 PROCEDURE — 84270 ASSAY OF SEX HORMONE GLOBUL: CPT | Mod: 90 | Performed by: PATHOLOGY

## 2021-05-27 PROCEDURE — G0103 PSA SCREENING: HCPCS | Performed by: PATHOLOGY

## 2021-05-27 PROCEDURE — 99204 OFFICE O/P NEW MOD 45 MIN: CPT | Mod: GC | Performed by: STUDENT IN AN ORGANIZED HEALTH CARE EDUCATION/TRAINING PROGRAM

## 2021-05-27 PROCEDURE — 80061 LIPID PANEL: CPT | Performed by: PATHOLOGY

## 2021-05-27 PROCEDURE — 85025 COMPLETE CBC W/AUTO DIFF WBC: CPT | Performed by: PATHOLOGY

## 2021-05-27 PROCEDURE — 36415 COLL VENOUS BLD VENIPUNCTURE: CPT | Performed by: PATHOLOGY

## 2021-05-27 RX ORDER — SILDENAFIL 100 MG/1
100 TABLET, FILM COATED ORAL DAILY PRN
Qty: 30 TABLET | Refills: 0 | Status: SHIPPED | OUTPATIENT
Start: 2021-05-27 | End: 2021-07-14

## 2021-05-27 ASSESSMENT — PAIN SCALES - GENERAL: PAINLEVEL: NO PAIN (0)

## 2021-05-27 NOTE — NURSING NOTE
Chief Complaint   Patient presents with     Establish Care     pt here to establish care       Kristen Moreno CMA, EMT at 12:40 PM on 5/27/2021.

## 2021-05-27 NOTE — LETTER
May 27, 2021      Saba Keith  [address]   [facility] [address]        Dear ,    We are writing to inform you of your test results.    Overall your labs all look to be within reasonable limits. You can follow up with me in 4-6 months.     If you have any questions or concerns, please call the clinic at the number listed above.       Sincerely,      Carlos Travis MD

## 2021-05-27 NOTE — LETTER
August 3, 2021      Saba KULDIP Keith  [address]   [facility] [address]        Dear ,    We are writing to inform you of your test results. Your testosterone labs and prostate labs are normal.     Resulted Orders   PSA screen   Result Value Ref Range    PSA 1.40 0 - 4 ug/L      Comment:      Assay Method:  Chemiluminescence using Siemens Vista analyzer   Lipid panel reflex to direct LDL Fasting   Result Value Ref Range    Cholesterol 159 <200 mg/dL    Triglycerides 54 <150 mg/dL    HDL Cholesterol 44 >39 mg/dL    LDL Cholesterol Calculated 105 (H) <100 mg/dL      Comment:      Above desirable:  100-129 mg/dl  Borderline High:  130-159 mg/dL  High:             160-189 mg/dL  Very high:       >189 mg/dl      Non HDL Cholesterol 115 <130 mg/dL   Comprehensive metabolic panel   Result Value Ref Range    Sodium 140 133 - 144 mmol/L    Potassium 4.0 3.4 - 5.3 mmol/L    Chloride 108 94 - 109 mmol/L    Carbon Dioxide 28 20 - 32 mmol/L    Anion Gap 4 3 - 14 mmol/L    Glucose 90 70 - 99 mg/dL    Urea Nitrogen 8 7 - 30 mg/dL    Creatinine 1.04 0.66 - 1.25 mg/dL    GFR Estimate 88 >60 mL/min/[1.73_m2]      Comment:      Non  GFR Calc  Starting 12/18/2018, serum creatinine based estimated GFR (eGFR) will be   calculated using the Chronic Kidney Disease Epidemiology Collaboration   (CKD-EPI) equation.      GFR Estimate If Black >90 >60 mL/min/[1.73_m2]      Comment:       GFR Calc  Starting 12/18/2018, serum creatinine based estimated GFR (eGFR) will be   calculated using the Chronic Kidney Disease Epidemiology Collaboration   (CKD-EPI) equation.      Calcium 8.8 8.5 - 10.1 mg/dL    Bilirubin Total 0.6 0.2 - 1.3 mg/dL    Albumin 3.8 3.4 - 5.0 g/dL    Protein Total 7.7 6.8 - 8.8 g/dL    Alkaline Phosphatase 96 40 - 150 U/L    ALT 31 0 - 70 U/L    AST 23 0 - 45 U/L   CBC with platelets differential   Result Value Ref Range    WBC 5.0 4.0 - 11.0 10e9/L    RBC Count 4.44 4.4 - 5.9  10e12/L    Hemoglobin 14.7 13.3 - 17.7 g/dL    Hematocrit 41.9 40.0 - 53.0 %    MCV 94 78 - 100 fl    MCH 33.1 (H) 26.5 - 33.0 pg    MCHC 35.1 31.5 - 36.5 g/dL    RDW 12.6 10.0 - 15.0 %    Platelet Count 236 150 - 450 10e9/L    Diff Method Automated Method     % Neutrophils 48.8 %    % Lymphocytes 32.8 %    % Monocytes 13.8 %    % Eosinophils 3.6 %    % Basophils 0.8 %    % Immature Granulocytes 0.2 %    Nucleated RBCs 0 0 /100    Absolute Neutrophil 2.4 1.6 - 8.3 10e9/L    Absolute Lymphocytes 1.6 0.8 - 5.3 10e9/L    Absolute Monocytes 0.7 0.0 - 1.3 10e9/L    Absolute Eosinophils 0.2 0.0 - 0.7 10e9/L    Absolute Basophils 0.0 0.0 - 0.2 10e9/L    Abs Immature Granulocytes 0.0 0 - 0.4 10e9/L    Absolute Nucleated RBC 0.0    Testosterone Free and Total   Result Value Ref Range    Testosterone Total 579 240 - 950 ng/dL      Comment:      This test was developed and its performance characteristics determined by the   [facility].   It has not been cleared or approved by the FDA. The laboratory is regulated   under CLIA as qualified to perform high-complexity testing. This test is used   for clinical purposes. It should not be regarded as investigational or for   research.      Sex Hormone Binding Globulin 21 11 - 80 nmol/L    Free Testosterone Calculated 15.61 4.7 - 24.4 ng/dL       If you have any questions or concerns, please call the clinic at the number listed above.       Sincerely,      Andreea Marina MD

## 2021-05-27 NOTE — PROGRESS NOTES
"  PRIMARY CARE CENTER         HPI:       Saba Keith is a 40 year old male who presents for the following  Patient presents with: No chief complaint on file.    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     Surgical History   -He underwent a total of 3 surgeries on the right knee.  The last one was in 2006. Follow previously by Dr Bowman from orthopedic surgery    Family History   No significant past medical history     Social History  Smoking tobacco - used to smoke 1/4 pack daily for about 2 years and quit over 20 years ago  Alcohol - never had dependence but used to drink more frequently in the past. 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  Patient is a new patient to this clinic and so  I reviewed/updated the Past Medical History, the Family History and the Social History.          Review of Systems:     ROS  I have personally reviewed and updated the complete ROS on the day of the visit. Negative other than noted in HPI.          Physical Exam:   /82 (BP Location: Right arm, Patient Position: Sitting, Cuff Size: Adult Large)   Pulse 75   Wt 93.4 kg (206 lb)   SpO2 99%   BMI 33.25 kg/m    Body " mass index is 33.25 kg/m .  Vitals were reviewed       GENERAL APPEARANCE: healthy, alert and no distress     EYES: EOMI,  PERRL     HENT: ear canals and TM's normal and nose and mouth without ulcers or lesions     NECK: no adenopathy, no asymmetry, masses, or scars and thyroid normal to palpation     RESP: lungs clear to auscultation - no rales, rhonchi or wheezes     CV: regular rates and rhythm, normal S1 S2, no S3 or S4 and no murmur, click or rub     ABDOMEN:  soft, nontender, no HSM or masses and bowel sounds normal     MS: extremities normal- no gross deformities noted, no evidence of inflammation in joints, FROM in all extremities.     SKIN: no suspicious lesions or rashes     NEURO: Normal strength and tone, sensory exam grossly normal, mentation intact and speech normal     PSYCH: mentation appears normal. and affect normal/bright     LYMPHATICS: No cervical adenopathy        Results:   Labs ordered today     Assessment and Plan   Saba was seen today for establish care and to discuss erectile dysfunction.     Diagnoses and all orders for this visit:    Preventative health care  -     CBC with platelets differential; Future  -     Comprehensive metabolic panel; Future  -     Lipid panel reflex to direct LDL Fasting; Future    Erectile dysfunction due to diseases classified elsewhere  Already on testosterone therapy will check to ensure he is not supratherapeutic he is being treated at an outside clinic for [facility] called [facility]. Will be trying to switch those cares here. Will check PSA as well given he is on therapy.   -     Testosterone Free and Total; Future  -     sildenafil (VIAGRA) 100 MG tablet; Take 1 tablet (100 mg) by mouth daily as needed (Prior to sexual acitivity)       -     PSA screen; Future        Options for treatment and follow-up care were reviewed with the patient. Saba Keith engaged in the decision making process and verbalized understanding of the options discussed and agreed  with the final plan.    Mirit Mohsen Yacoup, MD  May 27, 2021    Pt was seen and plan of care discussed with Dr Andreea Marina.     Attending Addendum:  Patient seen and examined with resident in clinic today.  Pertinent portions of history and exam were independently verified by myself.  I agree with the exam and plan as outlined above with the following modifications: none.  Andreea Marina MD  Internal Medicine

## 2021-05-29 LAB
SHBG SERPL-SCNC: 21 NMOL/L (ref 11–80)
TESTOST FREE SERPL-MCNC: 15.61 NG/DL (ref 4.7–24.4)
TESTOST SERPL-MCNC: 579 NG/DL (ref 240–950)

## 2021-07-14 ENCOUNTER — TELEPHONE (OUTPATIENT)
Dept: FAMILY MEDICINE | Facility: CLINIC | Age: 41
End: 2021-07-14

## 2021-07-14 DIAGNOSIS — N52.1 ERECTILE DYSFUNCTION DUE TO DISEASES CLASSIFIED ELSEWHERE: ICD-10-CM

## 2021-07-14 RX ORDER — SILDENAFIL 100 MG/1
100 TABLET, FILM COATED ORAL DAILY PRN
Qty: 30 TABLET | Refills: 0 | Status: SHIPPED | OUTPATIENT
Start: 2021-07-14 | End: 2021-08-20

## 2021-07-14 NOTE — TELEPHONE ENCOUNTER
[facility] Call Center    Phone Message    May a detailed message be left on voicemail: yes     Reason for Call: Medication Refill Request    Has the patient contacted the pharmacy for the refill? Yes   Name of medication being requested: [id number]  Provider who prescribed the medication:   Pharmacy:  [address] [address]  Date medication is needed: as soon as possible         Action Taken: Message routed to:  [facility] primary care clinic    Travel Screening: Not Applicable

## 2021-07-14 NOTE — TELEPHONE ENCOUNTER
July 14, 2021  I have never seen Saba Keith although listed as primary MD.  He has been seen by other providers in [facility]. Please ask him to schedule appt with any provider for refill request ( not indicated in message but appears to be for Viagra).sildenafil (VIAGRA) 100 MG tablet  Best wishes,  Rogelio Cali MD

## 2021-08-20 DIAGNOSIS — N52.1 ERECTILE DYSFUNCTION DUE TO DISEASES CLASSIFIED ELSEWHERE: ICD-10-CM

## 2021-08-20 RX ORDER — SILDENAFIL 100 MG/1
100 TABLET, FILM COATED ORAL DAILY PRN
Qty: 30 TABLET | Refills: 3 | Status: SHIPPED | OUTPATIENT
Start: 2021-08-20 | End: 2021-09-15

## 2021-08-20 NOTE — TELEPHONE ENCOUNTER
[facility] Call Center    Phone Message    May a detailed message be left on voicemail: yes     Reason for Call: Medication Refill Request    Has the patient contacted the pharmacy for the refill? Yes   Name of medication being requested: Sildenafil  Provider who prescribed the medication: Dr. Travis  Pharmacy: [facility] [address] [place] [address]  Date medication is needed: when able         Action Taken: Message routed to:  [facility]    Travel Screening: Not Applicable

## 2021-08-20 NOTE — TELEPHONE ENCOUNTER
sildenafil (VIAGRA) 100 MG tablet   Last Written Prescription Date:  7/14/2021  Last Fill Quantity: 30,   # refills: 0  Last Office Visit : 5/27/2021  Future Office visit:  9/23/2021  30 Tabs, 3 Refills sent to pharm 8/20/2021      Bri Wolf RN  Central Triage Red Flags/Med Refills

## 2021-09-14 ENCOUNTER — TELEPHONE (OUTPATIENT)
Dept: INTERNAL MEDICINE | Facility: CLINIC | Age: 41
End: 2021-09-14

## 2021-09-14 DIAGNOSIS — N52.1 ERECTILE DYSFUNCTION DUE TO DISEASES CLASSIFIED ELSEWHERE: ICD-10-CM

## 2021-09-14 NOTE — TELEPHONE ENCOUNTER
[facility] Call Center    Phone Message    May a detailed message be left on voicemail: no     Reason for Call: Medication Refill Request    Has the patient contacted the pharmacy for the refill? Yes   Name of medication being requested: sildenafil (VIAGRA) 100 MG tablet  Provider who prescribed the medication:Dr. Travis  Pharmacy:      [facility] [address] [place] [address]    Date medication is needed: As soon as possible         Action Taken: Message routed to:  [facility]    Travel Screening: Not Applicable

## 2021-09-15 RX ORDER — SILDENAFIL 100 MG/1
100 TABLET, FILM COATED ORAL DAILY PRN
Qty: 10 TABLET | Refills: 3 | Status: SHIPPED | OUTPATIENT
Start: 2021-09-15 | End: 2021-10-08

## 2021-10-07 ENCOUNTER — TELEPHONE (OUTPATIENT)
Dept: INTERNAL MEDICINE | Facility: CLINIC | Age: 41
End: 2021-10-07

## 2021-10-07 DIAGNOSIS — N52.1 ERECTILE DYSFUNCTION DUE TO DISEASES CLASSIFIED ELSEWHERE: ICD-10-CM

## 2021-10-08 RX ORDER — SILDENAFIL 100 MG/1
100 TABLET, FILM COATED ORAL DAILY PRN
Qty: 10 TABLET | Refills: 3 | Status: SHIPPED | OUTPATIENT
Start: 2021-10-08 | End: 2022-02-28

## 2021-11-09 DIAGNOSIS — N52.1 ERECTILE DYSFUNCTION DUE TO DISEASES CLASSIFIED ELSEWHERE: ICD-10-CM

## 2021-11-09 RX ORDER — SILDENAFIL 100 MG/1
100 TABLET, FILM COATED ORAL DAILY PRN
Qty: 10 TABLET | Refills: 3 | Status: CANCELLED | OUTPATIENT
Start: 2021-11-09

## 2021-11-09 NOTE — TELEPHONE ENCOUNTER
Centralized Medication Refill Team note:    sildenafil (VIAGRA) 100 MG tablet     Last Written Prescription Date:  10/8/21  Last Fill Quantity: 10,   # refills: 3   Last Office Visit : 5/27/2021  Future Office visit:  None- no show x 2. Recommended return per lab result MyChart- 6 months    Writer called pharmacy @[phone] and spoke to staff: patient has multiple refills available for sildenafil: August, September and October prescriptions all have refills remaining. #160 doses total per pharmacy staff.  Left message on patient cell phone.

## 2021-11-09 NOTE — TELEPHONE ENCOUNTER
[facility] Call Center    Phone Message    May a detailed message be left on voicemail: yes     Reason for Call: Medication Refill Request    Has the patient contacted the pharmacy for the refill? Yes   Name of medication being requested: sildenafil (VIAGRA) 100 MG tablet  Provider who prescribed the medication: Angy  Pharmacy:      [facility] [address] [place] [address]    Date medication is needed: As soon as possible      Action Taken: Message routed to:  [facility]    Travel Screening: Not Applicable

## 2022-02-28 DIAGNOSIS — N52.1 ERECTILE DYSFUNCTION DUE TO DISEASES CLASSIFIED ELSEWHERE: ICD-10-CM

## 2022-02-28 RX ORDER — SILDENAFIL 100 MG/1
100 TABLET, FILM COATED ORAL DAILY PRN
Qty: 10 TABLET | Refills: 5 | Status: SHIPPED | OUTPATIENT
Start: 2022-02-28 | End: 2022-06-28

## 2022-02-28 NOTE — TELEPHONE ENCOUNTER
sildenafil (VIAGRA) 100 MG tablet  Last Written Prescription Date:   10/8/2021  Last Fill Quantity: 10,   # refills: 3  Last Office Visit :  5/27/2021  Future Office visit:  None  10 Tabs, 5 Refills sent to pharm 2/28/2022      Bri Wolf RN  Central Triage Red Flags/Med Refills

## 2022-02-28 NOTE — TELEPHONE ENCOUNTER
[facility] Call Center    Phone Message    May a detailed message be left on voicemail: yes     Reason for Call: Medication Refill Request    Has the patient contacted the pharmacy for the refill? Yes   Name of medication being requested: sildenafil (VIAGRA) 100 MG tablet  Provider who prescribed the medication: Carlos Travis MD  Pharmacy: [facility] [address] [place] [address]  Date medication is needed: asap    Patient's wife requesting meds are filled.      Action Taken: Message routed to:  [facility]    Travel Screening: Not Applicable

## 2022-06-20 ENCOUNTER — TELEPHONE (OUTPATIENT)
Dept: INTERNAL MEDICINE | Facility: CLINIC | Age: 42
End: 2022-06-20

## 2022-06-20 DIAGNOSIS — N52.1 ERECTILE DYSFUNCTION DUE TO DISEASES CLASSIFIED ELSEWHERE: Primary | ICD-10-CM

## 2022-06-20 NOTE — TELEPHONE ENCOUNTER
[facility] Call Center    Phone Message    May a detailed message be left on voicemail: yes     Reason for Call: Order(s): Other: need a referral for Urology for ED issues. Patient doesn't have insurance. Pt wife was given the number for the cost of care line so they know the number to call for information on how much this will cost.  Reason for requested: pt wife calling to get the appt please call patient back to schedule.  Date needed: ASA  Provider name: Dr Travis      Action Taken: Message routed to:  [facility]    Travel Screening: Not Applicable

## 2022-06-20 NOTE — TELEPHONE ENCOUNTER
Referral request sent to provider to review, approve, and sign.      Olu Zamudio CMA ([facility]) at 10:15 AM on 6/20/2022

## 2022-06-23 NOTE — TELEPHONE ENCOUNTER
Urology referral request approved by Dr. Travis today.  Patient will be contacted for an appointment.      Olu Zamudio CMA ([facility]) at 3:16 PM on 6/23/2022

## 2022-06-24 DIAGNOSIS — N52.1 ERECTILE DYSFUNCTION DUE TO DISEASES CLASSIFIED ELSEWHERE: ICD-10-CM

## 2022-06-28 RX ORDER — SILDENAFIL 100 MG/1
TABLET, FILM COATED ORAL
Qty: 10 TABLET | Refills: 0 | Status: SHIPPED | OUTPATIENT
Start: 2022-06-28

## 2022-06-28 NOTE — TELEPHONE ENCOUNTER
Sildenafil Citrate 100 MG Oral Tablet  Last Written Prescription Date:   2/28/2022  Last Fill Quantity: 10,   # refills: 5  Last Office Visit :  5/27/2021  Future Office visit:  None    Routing refill request to provider for review/approval because:  Second request  Needing updated office visit  Please call Pt to schedule       Bri Wolf RN  Central Triage Red Flags/Med Refills

## 2022-07-06 ENCOUNTER — PRE VISIT (OUTPATIENT)
Dept: UROLOGY | Facility: CLINIC | Age: 42
End: 2022-07-06

## 2022-07-06 NOTE — TELEPHONE ENCOUNTER
MEDICAL RECORDS REQUEST   [facility]  Urology Clinic  [address] [place] [address]  PHONE: [phone]  Fax: [phone]        FUTURE VISIT INFORMATION                                                   Saba Keith, : 1980 scheduled for future visit at [facility]    APPOINTMENT INFORMATION:    Date: 02/10/2023    Provider:  Noe Hunt MD    Reason for Visit/Diagnosis: ED    REFERRAL INFORMATION:    Referring provider:  Javy Hall MD in [facility] [place] INTERNAL MEDICINE    RECORDS REQUESTED FOR VISIT                                                     NOTES  STATUS/DETAILS   OFFICE NOTE from other specialist  yes, 2021 -- Carlos Travis MD in [facility] [place] INTERNAL MEDICINE   MEDICATION LIST  yes     PRE-VISIT CHECKLIST      Record collection complete Yes   Appointment appropriately scheduled           (right time/right provider) Yes   Joint diagnostic appointment coordinated correctly          (ensure right order & amount of time) Yes   MyChart activation If no, please explain pending   Questionnaire complete If no, please explain pending

## 2022-07-08 ENCOUNTER — PRE VISIT (OUTPATIENT)
Dept: UROLOGY | Facility: CLINIC | Age: 42
End: 2022-07-08

## 2022-07-08 NOTE — TELEPHONE ENCOUNTER
Reason for visit: Consult     Relevant information: erectile dysfunction    Records/imaging/labs/orders: in EPIC    Pt called: no    At Rooming: normal

## 2022-10-10 ENCOUNTER — HEALTH MAINTENANCE LETTER (OUTPATIENT)
Age: 42
End: 2022-10-10

## 2023-05-24 NOTE — PROGRESS NOTES
"[facility] Dermatology Note  Encounter Date: Mar 26, 2021  Office Visit     Dermatology Problem List:  1. Hyperpigmentation 2/2 lemon on skin of right lower eyelid  - Hydroquinone 4% cream BID x3 months on, then one month off, then repeat as needed  - Diligent sunscreen    ____________________________________________    Assessment & Plan:     # Hyperpigmentation: This is either phytophotodermatitis or chemical burn from the lemon exposure on the skin. Of note, patient has old scar from childhood in the R medial canthus that does not bother him. Rather, it is the hyperpigmentation he is concerned about. Discussed that pigmentation does tend to fade over time, but it is a very slow process. To speed up process, recommended dilgenec with photoprotection (daily sunscreen) and hydroquinone 4% cream BID for 3 months on, then one month off, then cycle again as needed. We discussed paradoxical darkening of the skin can occur with continuous use of hydroquinone. We discussed that hydroquinone can be irritating to the skin, which can cause skin darkening too, so to take breaks from using if skin becomes irritated and only to use a very thin layer of the hydroquinone with each application. We discussed lack of insurance coverage of hydroquinone and how to get cheapest price through [employer]. Coupon was printed for patient today. Would classify as chronic problem (present 2 years), and stable.     Procedures Performed:   None    Follow-up: PRN    Staff:     Esperanza Morgan MD    Department of Dermatology  [facility]: Phone: [phone], Fax:[phone]  [facility]: Phone: [phone], Fax: [phone]    ____________________________________________    CC: Derm Problem (concerned about scar on face from a \"lemon that scarred face\" after he put it on a \"bump\" in the " area)    HPI:  Mr. Saba Keith is a(n) 40 year old male who presents today as a new patient for scar on the face.    Saba notes that he had a bump on the right side of the nose. A family member told him applying lemon could help. After applying it on the skin, he fell asleep with it on. He woke up with what seemed like a burn to him. It was quite tender. Since then (about 2 years ago), it has been discolored. This bugs him. Of note, he has a linear scar on the right side of his nose that is from childhood. This does not bother him at all.    Patient is otherwise feeling well, without additional skin concerns.     Labs Reviewed:  N/A    Physical Exam:  Vitals: There were no vitals taken for this visit.  SKIN: Focused exam of the face.  - Linear appearing hyperpigmentation from the right medial canthus to the right lower eyelid.  - No other lesions of concern on areas examined.     Medications:  Current Outpatient Medications   Medication     diphenhydrAMINE (BENADRYL) 25 MG tablet     oxyCODONE-acetaminophen (PERCOCET) 5-325 MG per tablet     predniSONE (DELTASONE) 20 MG tablet     No current facility-administered medications for this visit.       Past Medical History:   Patient Active Problem List   Diagnosis     Sarcoma (H)     Medication refill-Do not remove     Pain medication agreement signed-Dr. Mak 01/08/2015     Past Medical History:   Diagnosis Date     Cancer (H)     sarcoma 2006     NO ACTIVE PROBLEMS        CC Referred Self, MD  No address on file on close of this encounter.     Tissue Cultured Epidermal Autograft Text: The defect edges were debeveled with a #15 scalpel blade.  Given the location of the defect, shape of the defect and the proximity to free margins a tissue cultured epidermal autograft was deemed most appropriate.  The graft was then trimmed to fit the size of the defect.  The graft was then placed in the primary defect and oriented appropriately.

## 2023-10-29 ENCOUNTER — HEALTH MAINTENANCE LETTER (OUTPATIENT)
Age: 43
End: 2023-10-29

## 2023-11-09 DIAGNOSIS — C49.9 SARCOMA (H): Primary | ICD-10-CM

## 2023-11-09 DIAGNOSIS — M25.561 RIGHT KNEE PAIN: ICD-10-CM

## 2023-11-16 ENCOUNTER — TELEPHONE (OUTPATIENT)
Dept: ORTHOPEDICS | Facility: CLINIC | Age: 43
End: 2023-11-16

## 2023-11-16 NOTE — TELEPHONE ENCOUNTER
[facility]    Phone Message    May a detailed message be left on voicemail: yes     Reason for Call: Other: Kathryn Heathermary needs to rescheduled his appt today because he wants to wait until his insurance kicks in and next is not until 12/21 can we get him in sooner? Thank you, Brittnee     Action Taken: Other: [facility]    Travel Screening: Not Applicable

## 2023-11-30 NOTE — TELEPHONE ENCOUNTER
DIAGNOSIS: Saw Dr. Bowman in the past right knee pain sarcoma recurrance?    APPOINTMENT DATE: 12/7/23   NOTES STATUS DETAILS   OFFICE NOTE from referring provider Internal 11/9/23 LENY BOWMAN MD   OFFICE NOTE from other specialist Internal 4/8/19 DOROTEO SCHERER MD - RIGHT KNEE PAIN   MEDICATION LIST Internal    XRAYS (IMAGES & REPORTS) Internal 6/10/15 XR KNEE RIGHT   8/5/16 XR KNEE RIGHT   4/8/19 XR KNEE RIGHT

## 2023-12-07 ENCOUNTER — PRE VISIT (OUTPATIENT)
Dept: ORTHOPEDICS | Facility: CLINIC | Age: 43
End: 2023-12-07

## 2023-12-07 ENCOUNTER — OFFICE VISIT (OUTPATIENT)
Dept: ORTHOPEDICS | Facility: CLINIC | Age: 43
End: 2023-12-07

## 2023-12-07 ENCOUNTER — ANCILLARY PROCEDURE (OUTPATIENT)
Dept: GENERAL RADIOLOGY | Facility: CLINIC | Age: 43
End: 2023-12-07
Attending: ORTHOPAEDIC SURGERY

## 2023-12-07 DIAGNOSIS — M25.561 CHRONIC PAIN OF RIGHT KNEE: ICD-10-CM

## 2023-12-07 DIAGNOSIS — G89.29 CHRONIC PAIN OF RIGHT KNEE: ICD-10-CM

## 2023-12-07 DIAGNOSIS — C49.9 SARCOMA (H): Primary | ICD-10-CM

## 2023-12-07 DIAGNOSIS — C49.9 SARCOMA (H): ICD-10-CM

## 2023-12-07 DIAGNOSIS — M25.561 RIGHT KNEE PAIN: ICD-10-CM

## 2023-12-07 PROCEDURE — 73562 X-RAY EXAM OF KNEE 3: CPT | Mod: RT | Performed by: RADIOLOGY

## 2023-12-07 PROCEDURE — 99203 OFFICE O/P NEW LOW 30 MIN: CPT | Performed by: PHYSICIAN ASSISTANT

## 2023-12-07 NOTE — NURSING NOTE
Chief Complaint   Patient presents with    Consult     -Discuss right knee pain with prolong walking and standing, last seen 2016  -pt denies traumas or falls        43 year old  1980    Primary MD: Carlos Travis          Pain Assessment  Patient Currently in Pain: Yes  0-10 Pain Scale: 5  Primary Pain Location: Knee (right)  Pain Descriptors:  (sensitive to weather)             [id number] - [place], [place] - [address] [id number] - [place], [place] - [address] [facility] - [place], [place] - [address] [facility]        No Known Allergies        Current Outpatient Medications   Medication    medical cannabis (Patient's own supply)    hydroquinone (GAMAL) 4 % external cream    oxyCODONE-acetaminophen (PERCOCET) 5-325 MG per tablet    sildenafil (VIAGRA) 100 MG tablet     No current facility-administered medications for this visit.

## 2023-12-07 NOTE — PROGRESS NOTES
Chief Complaint: Right leg check     DIAGNOSIS: Recurrent synovial sarcoma, right medial thigh and popliteal fossa.    TREATMENT: Preoperative radiation and surgical resection August 2006.         HPI: Saba is a 43-year-old man who is here for follow-up of his right lower extremity.  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 Bowman twice.  He had done preop radiation as well.  He reports that since the surgery, his leg has always been uncomfortable with occasional pain, stiffness and spasm.  He also has swelling in the lower leg and dryness of the skin.  He reports heat, stretching and hydration seem to help his symptoms.  He occasionally wears a knee sleeve.  He occasionally wears compression stockings.  Rest from activity also helps him as well.  He wanted to be checked to make sure there were no concerning findings and to get an x-ray on his knee due to the aching in the medial knee with weather changes.  He denies any catching, locking or giving way.  He denies any new masses that he can feel.  No other concerns.    Physical Exam: Saba is a 43-year-old male who is alert and oriented no apparent distress.  He has a nonantalgic reciprocal gait without gait assistance today.  He has right medial knee and lower thigh and upper lower leg has a large scar that is well-healed.  There is missing muscle defect.  No palpable masses or swelling along the medial or posterior knee.  He does have +1 edema of the right lower extremity.  He has some dryness of the skin and shininess of the skin, consistent with previous radiation.  He has full ankle range of motion.  His knee is almost full motion today.  No laxity with valgus or varus stress testing, as well as no pain.  He does have some tenderness to palpation of the medial and lateral joint line.  No pain along the patellar tendon or quadriceps tendon.  Strength testing is 5 out of 5 resisted knee  extension, hip flexion and knee flexion without pain.    Imaging: Three-view x-ray of the right knee was ordered and obtained today.  This shows surgical clips posteriorly from the previous surgery.  Some loss of mineralization of the right proximal tibia, but no obvious fracture, lesion, or soft tissue mass.  No significant osteoarthritis of the right knee.    Impression: 43-year-old male doing well status post excision and neoadjuvant radiation of synovial sarcoma of the right medial and posterior knee with no sign of recurrence but persistent symptoms likely related to late radiation effects    Plan: Josup overall looks excellent with regards to his right knee and right leg.  We see no signs of recurrence or concerning findings.  He actually does not even have any significant arthritis that we can see.  I think a lot of his effects are late effects of radiation.  He is doing a really good job of managing the symptoms with heat, stretching, hydration, and physical support.  Patient continue with these as tolerated.  We do not have any specific intervention that we would recommend at this time.  He can follow-up as needed with any concerns.  He agrees with the plan.  All questions answered.    Patient was also examined by Dr. Bowamn, and he agrees with the plan of care.

## 2023-12-07 NOTE — LETTER
12/7/2023         RE: Saba Keith  [address]  [facility] [address]        Dear Colleague,    Thank you for referring your patient, Saba eKith, to the [facility] [place]. Please see a copy of my visit note below.    Chief Complaint: Right leg check     DIAGNOSIS: Recurrent synovial sarcoma, right medial thigh and popliteal fossa.    TREATMENT: Preoperative radiation and surgical resection August 2006.         HPI: Saba is a 43-year-old man who is here for follow-up of his right lower extremity.  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 Bowman twice.  He had done preop radiation as well.  He reports that since the surgery, his leg has always been uncomfortable with occasional pain, stiffness and spasm.  He also has swelling in the lower leg and dryness of the skin.  He reports heat, stretching and hydration seem to help his symptoms.  He occasionally wears a knee sleeve.  He occasionally wears compression stockings.  Rest from activity also helps him as well.  He wanted to be checked to make sure there were no concerning findings and to get an x-ray on his knee due to the aching in the medial knee with weather changes.  He denies any catching, locking or giving way.  He denies any new masses that he can feel.  No other concerns.    Physical Exam: Saba is a 43-year-old male who is alert and oriented no apparent distress.  He has a nonantalgic reciprocal gait without gait assistance today.  He has right medial knee and lower thigh and upper lower leg has a large scar that is well-healed.  There is missing muscle defect.  No palpable masses or swelling along the medial or posterior knee.  He does have +1 edema of the right lower extremity.  He has some dryness of the skin and shininess of the skin, consistent with previous radiation.  He has full ankle range of motion.  His knee is almost  full motion today.  No laxity with valgus or varus stress testing, as well as no pain.  He does have some tenderness to palpation of the medial and lateral joint line.  No pain along the patellar tendon or quadriceps tendon.  Strength testing is 5 out of 5 resisted knee extension, hip flexion and knee flexion without pain.    Imaging: Three-view x-ray of the right knee was ordered and obtained today.  This shows surgical clips posteriorly from the previous surgery.  Some loss of mineralization of the right proximal tibia, but no obvious fracture, lesion, or soft tissue mass.  No significant osteoarthritis of the right knee.    Impression: 43-year-old male doing well status post excision and neoadjuvant radiation of synovial sarcoma of the right medial and posterior knee with no sign of recurrence but persistent symptoms likely related to late radiation effects    Plan: Josup overall looks excellent with regards to his right knee and right leg.  We see no signs of recurrence or concerning findings.  He actually does not even have any significant arthritis that we can see.  I think a lot of his effects are late effects of radiation.  He is doing a really good job of managing the symptoms with heat, stretching, hydration, and physical support.  Patient continue with these as tolerated.  We do not have any specific intervention that we would recommend at this time.  He can follow-up as needed with any concerns.  He agrees with the plan.  All questions answered.    Patient was also examined by Dr. Bowman, and he agrees with the plan of care.    Patient has a history of right knee synovial sarcoma treated many years ago.  I saw him after Carmella NERI had examined him and performed her evaluation.  I agree with her findings.  In summary we will follow him up as needed.        Elan Bowman MD

## 2023-12-08 NOTE — PROGRESS NOTES
Patient has a history of right knee synovial sarcoma treated many years ago.  I saw him after Carmella NERI had examined him and performed her evaluation.  I agree with her findings.  In summary we will follow him up as needed.

## 2024-12-21 ENCOUNTER — HEALTH MAINTENANCE LETTER (OUTPATIENT)
Age: 44
End: 2024-12-21

## 2025-01-08 NOTE — TELEPHONE ENCOUNTER
[facility] Call [facility]    Phone Message    May a detailed message be left on voicemail: yes     Reason for Call: Medication Refill Request    Has the patient contacted the pharmacy for the refill? Yes   Name of medication being requested: sildenafil (VIAGRA) 100 MG tablet  Provider who prescribed the medication: Dr. Travis  Pharmacy: [facility] [address] [place] [address]  Date medication is needed: ASAP     Action Taken: Message routed to:  [facility] ([facility]    Travel Screening: Not Applicable                                                                       [Follow-Up: _____] : a [unfilled] follow-up visit

## 2025-02-01 ENCOUNTER — HOSPITAL ENCOUNTER (EMERGENCY)
Facility: CLINIC | Age: 45
Discharge: HOME | End: 2025-02-01
Attending: STUDENT IN AN ORGANIZED HEALTH CARE EDUCATION/TRAINING PROGRAM | Admitting: STUDENT IN AN ORGANIZED HEALTH CARE EDUCATION/TRAINING PROGRAM

## 2025-02-01 ENCOUNTER — APPOINTMENT (OUTPATIENT)
Dept: CT IMAGING | Facility: CLINIC | Age: 45
End: 2025-02-01
Attending: STUDENT IN AN ORGANIZED HEALTH CARE EDUCATION/TRAINING PROGRAM

## 2025-02-01 VITALS
RESPIRATION RATE: 18 BRPM | WEIGHT: 180 LBS | TEMPERATURE: 98.3 F | DIASTOLIC BLOOD PRESSURE: 60 MMHG | HEART RATE: 77 BPM | OXYGEN SATURATION: 99 % | HEIGHT: 66 IN | BODY MASS INDEX: 28.93 KG/M2 | SYSTOLIC BLOOD PRESSURE: 120 MMHG

## 2025-02-01 DIAGNOSIS — K04.7 DENTAL INFECTION: ICD-10-CM

## 2025-02-01 DIAGNOSIS — S02.5XXB OPEN FRACTURE OF TOOTH, INITIAL ENCOUNTER: ICD-10-CM

## 2025-02-01 PROCEDURE — 99284 EMERGENCY DEPT VISIT MOD MDM: CPT | Mod: 25 | Performed by: STUDENT IN AN ORGANIZED HEALTH CARE EDUCATION/TRAINING PROGRAM

## 2025-02-01 PROCEDURE — 250N000013 HC RX MED GY IP 250 OP 250 PS 637: Performed by: STUDENT IN AN ORGANIZED HEALTH CARE EDUCATION/TRAINING PROGRAM

## 2025-02-01 PROCEDURE — 99284 EMERGENCY DEPT VISIT MOD MDM: CPT | Performed by: STUDENT IN AN ORGANIZED HEALTH CARE EDUCATION/TRAINING PROGRAM

## 2025-02-01 PROCEDURE — 70486 CT MAXILLOFACIAL W/O DYE: CPT

## 2025-02-01 PROCEDURE — 70486 CT MAXILLOFACIAL W/O DYE: CPT | Mod: 26 | Performed by: RADIOLOGY

## 2025-02-01 RX ORDER — ACETAMINOPHEN 325 MG/1
975 TABLET ORAL ONCE
Status: COMPLETED | OUTPATIENT
Start: 2025-02-01 | End: 2025-02-01

## 2025-02-01 RX ORDER — OXYCODONE HYDROCHLORIDE 5 MG/1
5 TABLET ORAL EVERY 6 HOURS PRN
Qty: 8 TABLET | Refills: 0 | Status: SHIPPED | OUTPATIENT
Start: 2025-02-01 | End: 2025-02-03

## 2025-02-01 RX ORDER — IBUPROFEN 600 MG/1
600 TABLET, FILM COATED ORAL ONCE
Status: COMPLETED | OUTPATIENT
Start: 2025-02-01 | End: 2025-02-01

## 2025-02-01 RX ORDER — OXYCODONE HYDROCHLORIDE 5 MG/1
5 TABLET ORAL ONCE
Status: COMPLETED | OUTPATIENT
Start: 2025-02-01 | End: 2025-02-01

## 2025-02-01 RX ADMIN — ACETAMINOPHEN 975 MG: 325 TABLET, FILM COATED ORAL at 21:31

## 2025-02-01 RX ADMIN — IBUPROFEN 600 MG: 600 TABLET, FILM COATED ORAL at 21:31

## 2025-02-01 RX ADMIN — OXYCODONE HYDROCHLORIDE 5 MG: 5 TABLET ORAL at 21:31

## 2025-02-01 ASSESSMENT — ACTIVITIES OF DAILY LIVING (ADL)
ADLS_ACUITY_SCORE: 41

## 2025-02-01 ASSESSMENT — COLUMBIA-SUICIDE SEVERITY RATING SCALE - C-SSRS
1. IN THE PAST MONTH, HAVE YOU WISHED YOU WERE DEAD OR WISHED YOU COULD GO TO SLEEP AND NOT WAKE UP?: NO
6. HAVE YOU EVER DONE ANYTHING, STARTED TO DO ANYTHING, OR PREPARED TO DO ANYTHING TO END YOUR LIFE?: NO
2. HAVE YOU ACTUALLY HAD ANY THOUGHTS OF KILLING YOURSELF IN THE PAST MONTH?: NO

## 2025-02-02 NOTE — ED TRIAGE NOTES
Patient checked in but abruptly went outside an has not returned. The patient left 14 minutes ago.

## 2025-02-02 NOTE — DISCHARGE INSTRUCTIONS
You were seen in emergency department due to an infection in your broken tooth.  We would recommend you start antibiotics and follow-up with a dental appointment on the early beginning of this next week for dental extraction.    Return to the emergent with any worsening severe swelling, fevers or chills, or any other concerning symptoms.    Many of these clinics offer a sliding fee option for patients that qualify, and see patients on a walk-in or same day basis. Please call each clinic directly. As services, hours, fees and policies vary greatly.    [place]:  [facility]     [phone]  [facility] ([facility]) [phone]  [facility]  [phone]  [facility]      [phone]   [facility]    [phone]  [facility]  [phone]  [facility] ([facility]) [phone]  [facility]     [phone]  [facility]   [phone]  [facility] (cash only)   [phone]  [facility]    [phone] (adults)          [phone] (children)    [place]:  [facility]     [phone]; [phone]  [facility]     [phone]  [facility]     [phone]  [facility] (free, limited)    [phone]    Multiple Locations:  [facility]       [phone]

## 2025-02-02 NOTE — ED PROVIDER NOTES
ED Provider Note  [facility]      History     Chief Complaint   Patient presents with    Dental Pain     Lower left molar fracture. Patient states that the tooth initially broke a month ago but now the patient is having swelling and increased pain in the tooth.      HPI  Saba Keith is a 44 year old male presenting to the emergency department due to dental pain.    Notes that his tooth initially broke about a month ago.  He thinks he might of had a filling in the tooth, but is not quite sure.  About a week ago it broke some more.  He started noticing some increasing swelling in the left side of his face ever since for the last week.  Notes has been using Tylenol and some topical pain medicine in the mouth but it is not helping anymore.  He notes that he does not have dental insurance which is why he was trying to wait and see if the pain would get better.    No fevers or chills.    Past Medical History  Past Medical History:   Diagnosis Date    Cancer (H)     sarcoma 2006    NO ACTIVE PROBLEMS      Past Surgical History:   Procedure Laterality Date    KNEE SURGERY  2002 2002 and 8-7-06, recurrent synovial sarcoma.    ORTHOPEDIC SURGERY       oxyCODONE (ROXICODONE) 5 MG tablet  amoxicillin-clavulanate (AUGMENTIN) 875-125 MG tablet  hydroquinone (GAMAL) 4 % external cream  medical cannabis (Patient's own supply)  oxyCODONE-acetaminophen (PERCOCET) 5-325 MG per tablet  sildenafil (VIAGRA) 100 MG tablet      No Known Allergies  Family History  Family History   Problem Relation Age of Onset    Family History Negative No family hx of      Social History   Social History     Tobacco Use    Smoking status: Never    Smokeless tobacco: Never   Substance Use Topics    Alcohol use: No    Drug use: Yes     Types: Marijuana      A medically appropriate review of systems was performed with pertinent positives and negatives noted in the HPI, and all other systems negative.    Physical Exam   BP:  "123/65  Pulse: 72  Temp: 98.3  F (36.8  C)  Resp: 17  Height: 167.6 cm (5' 6\")  Weight: 81.6 kg (180 lb)  SpO2: 99 %  Physical Exam  GEN: Well appearing, non toxic, cooperative  HEENT: normocephalic and atraumatic, PERRLA, EOMI, left first molar with gaping missing piece noted, tenderness to palpation of the tooth, and a moderate amount of swelling and discomfort of the periapical surface  CV: well-perfused, normal skin color for ethnicity  PULM: breathing comfortably, in no respiratory distress  ABD: nondistended  EXT: Full range of motion.  No edema.  NEURO: awake, conversant, grossly normal bilateral upper and lower extremity strength & ROM   SKIN: No rashes, ecchymosis, or lacerations  PSYCH: Calm and cooperative, interactive      ED Course, Procedures, & Data      Procedures          Results for orders placed or performed during the hospital encounter of 02/01/25   CT Dental wo Contrast     Status: None (Preliminary result)    Narrative    EXAM: CT DENTAL WO CONTRAST  LOCATION: [facility]  DATE: 2/1/2025    INDICATION: Left molar pain, swelling, eval for apical abscess.  COMPARISON: None.  TECHNIQUE: Routine CT Maxillofacial without IV contrast. Multiplanar reformats. Dose reduction techniques were used.     FINDINGS:  OSSEOUS STRUCTURES/SOFT TISSUES: No localized soft tissue swelling/inflammation. No facial bone fracture or malalignment. Large dental cavity involving the left first mandibular molar. No significant periapical lucency.    ORBITAL CONTENTS: No acute abnormality.    SINUSES: No paranasal sinus mucosal disease.    VISUALIZED INTRACRANIAL CONTENTS: No acute abnormality.       Impression    IMPRESSION:   1.  Dental cavity left first mandibular molar. No significant periapical lucency or soft tissue inflammation.       Medications   acetaminophen (TYLENOL) tablet 975 mg (975 mg Oral $Given 2/1/25 2131)   ibuprofen (ADVIL/MOTRIN) tablet 600 mg (600 mg Oral " $Given 2/1/25 2131)   oxyCODONE (ROXICODONE) tablet 5 mg (5 mg Oral $Given 2/1/25 2131)     Labs Ordered and Resulted from Time of ED Arrival to Time of ED Departure - No data to display  CT Dental wo Contrast   Preliminary Result   IMPRESSION:    1.  Dental cavity left first mandibular molar. No significant periapical lucency or soft tissue inflammation.                Critical care was not performed.     Medical Decision Making  The patient's presentation was of moderate complexity (an acute illness with systemic symptoms).    The patient's evaluation involved:  review of external note(s) from 3+ sources (see separate area of note for details)  ordering and/or review of 1 test(s) in this encounter (see separate area of note for details)  discussion of management or test interpretation with another health professional (see separate area of note for details)    The patient's management necessitated moderate risk (prescription drug management including medications given in the ED).    Assessment & Plan    44-year-old male with a history of right knee synovial sarcoma treated a number of years ago presenting to the emergency department due to 1 month of dental pain after dental fracture, now with increasing swelling, tenderness to palpation and some swelling at the gingival line concerning for apical abscess.    Will plan for dental CT, dentistry consult.  Will give pain control here.  No evidence of significant prolonged swelling into the subcutaneous tissue.  No evidence concerning for Adryan angina or deep tissue infection.    11:02 PM CT without fluid collection. I did speak with dental - they recommend abx & discharge    I have reviewed the nursing notes. I have reviewed the findings, diagnosis, plan and need for follow up with the patient.    New Prescriptions    AMOXICILLIN-CLAVULANATE (AUGMENTIN) 875-125 MG TABLET    Take 1 tablet by mouth 2 times daily for 7 days.    OXYCODONE (ROXICODONE) 5 MG TABLET    Take  1 tablet (5 mg) by mouth every 6 hours as needed for moderate pain or moderate to severe pain.       Final diagnoses:   Open fracture of tooth, initial encounter   Dental infection       Dottie Palm MD  [facility] EMERGENCY DEPARTMENT  2/1/2025     Dottie Palm MD  02/01/25 [id number]

## 2025-02-02 NOTE — ED TRIAGE NOTES
Lower left tooth fracture. Patient states that it initially broke a month ago but now the patient is having swelling and increased pain in the tooth.      Triage Assessment (Adult)       Row Name 02/01/25 2059          Triage Assessment    Airway WDL WDL        Respiratory WDL    Respiratory WDL WDL        Skin Circulation/Temperature WDL    Skin Circulation/Temperature WDL WDL        Cardiac WDL    Cardiac WDL WDL        Peripheral/Neurovascular WDL    Peripheral Neurovascular WDL WDL        Cognitive/Neuro/Behavioral WDL    Cognitive/Neuro/Behavioral WDL WDL